# Patient Record
Sex: MALE | Race: BLACK OR AFRICAN AMERICAN | NOT HISPANIC OR LATINO | URBAN - METROPOLITAN AREA
[De-identification: names, ages, dates, MRNs, and addresses within clinical notes are randomized per-mention and may not be internally consistent; named-entity substitution may affect disease eponyms.]

---

## 2017-02-07 ENCOUNTER — OUTPATIENT (OUTPATIENT)
Dept: OUTPATIENT SERVICES | Facility: HOSPITAL | Age: 72
LOS: 1 days | Discharge: HOME | End: 2017-02-07

## 2017-02-07 DIAGNOSIS — D61.9 APLASTIC ANEMIA, UNSPECIFIED: ICD-10-CM

## 2017-02-09 ENCOUNTER — RESULT REVIEW (OUTPATIENT)
Age: 72
End: 2017-02-09

## 2017-02-10 ENCOUNTER — APPOINTMENT (OUTPATIENT)
Dept: HEMATOLOGY ONCOLOGY | Facility: CLINIC | Age: 72
End: 2017-02-10

## 2017-02-10 VITALS
DIASTOLIC BLOOD PRESSURE: 85 MMHG | WEIGHT: 213.85 LBS | TEMPERATURE: 97.9 F | SYSTOLIC BLOOD PRESSURE: 131 MMHG | RESPIRATION RATE: 16 BRPM | BODY MASS INDEX: 31.58 KG/M2 | HEART RATE: 79 BPM | OXYGEN SATURATION: 99 %

## 2017-02-10 DIAGNOSIS — Z86.2 PERSONAL HISTORY OF DISEASES OF THE BLOOD AND BLOOD-FORMING ORGANS AND CERTAIN DISORDERS INVOLVING THE IMMUNE MECHANISM: ICD-10-CM

## 2017-02-10 LAB
BASOPHILS # BLD AUTO: 0.1 K/UL — SIGNIFICANT CHANGE UP (ref 0–0.2)
BASOPHILS NFR BLD AUTO: 1.2 % — SIGNIFICANT CHANGE UP (ref 0–2)
EOSINOPHIL # BLD AUTO: 0.1 K/UL — SIGNIFICANT CHANGE UP (ref 0–0.5)
EOSINOPHIL NFR BLD AUTO: 1.1 % — SIGNIFICANT CHANGE UP (ref 0–6)
HCT VFR BLD CALC: 42.1 % — SIGNIFICANT CHANGE UP (ref 39–50)
HGB BLD-MCNC: 14 G/DL — SIGNIFICANT CHANGE UP (ref 13–17)
LYMPHOCYTES # BLD AUTO: 2.2 K/UL — SIGNIFICANT CHANGE UP (ref 1–3.3)
LYMPHOCYTES # BLD AUTO: 44 % — SIGNIFICANT CHANGE UP (ref 13–44)
MCHC RBC-ENTMCNC: 33.3 GM/DL — SIGNIFICANT CHANGE UP (ref 32–36)
MCHC RBC-ENTMCNC: 34.4 PG — HIGH (ref 27–34)
MCV RBC AUTO: 103 FL — HIGH (ref 80–100)
MONOCYTES # BLD AUTO: 0.5 K/UL — SIGNIFICANT CHANGE UP (ref 0–0.9)
MONOCYTES NFR BLD AUTO: 9.4 % — SIGNIFICANT CHANGE UP (ref 2–14)
NEUTROPHILS # BLD AUTO: 2.2 K/UL — SIGNIFICANT CHANGE UP (ref 1.8–7.4)
NEUTROPHILS NFR BLD AUTO: 44.3 % — SIGNIFICANT CHANGE UP (ref 43–77)
PLATELET # BLD AUTO: 106 K/UL — LOW (ref 150–400)
RBC # BLD: 4.07 M/UL — LOW (ref 4.2–5.8)
RBC # FLD: 11.7 % — SIGNIFICANT CHANGE UP (ref 10.3–14.5)
WBC # BLD: 5.1 K/UL — SIGNIFICANT CHANGE UP (ref 3.8–10.5)
WBC # FLD AUTO: 5.1 K/UL — SIGNIFICANT CHANGE UP (ref 3.8–10.5)

## 2017-02-11 LAB
ALBUMIN SERPL ELPH-MCNC: 4.3 G/DL
ALP BLD-CCNC: 103 U/L
ALT SERPL-CCNC: 12 U/L
ANION GAP SERPL CALC-SCNC: 13 MMOL/L
AST SERPL-CCNC: 19 U/L
BILIRUB SERPL-MCNC: 0.9 MG/DL
BUN SERPL-MCNC: 14 MG/DL
CALCIUM SERPL-MCNC: 9.4 MG/DL
CHLORIDE SERPL-SCNC: 105 MMOL/L
CO2 SERPL-SCNC: 23 MMOL/L
CREAT SERPL-MCNC: 1.3 MG/DL
FERRITIN SERPL-MCNC: 299.4 NG/ML
GLUCOSE SERPL-MCNC: 103 MG/DL
LDH SERPL-CCNC: 200 U/L
POTASSIUM SERPL-SCNC: 4.7 MMOL/L
PROT SERPL-MCNC: 7.2 G/DL
SODIUM SERPL-SCNC: 141 MMOL/L

## 2017-02-13 LAB — GASTRIN SERPL-MCNC: 55 PG/ML

## 2017-03-15 ENCOUNTER — APPOINTMENT (OUTPATIENT)
Age: 72
End: 2017-03-15

## 2017-03-15 VITALS
HEIGHT: 69 IN | HEART RATE: 76 BPM | DIASTOLIC BLOOD PRESSURE: 79 MMHG | RESPIRATION RATE: 17 BRPM | SYSTOLIC BLOOD PRESSURE: 163 MMHG | WEIGHT: 214 LBS | BODY MASS INDEX: 31.7 KG/M2 | TEMPERATURE: 97.4 F

## 2017-05-18 ENCOUNTER — APPOINTMENT (OUTPATIENT)
Dept: ORTHOPEDIC SURGERY | Facility: CLINIC | Age: 72
End: 2017-05-18

## 2017-05-18 VITALS
BODY MASS INDEX: 31.7 KG/M2 | WEIGHT: 214 LBS | SYSTOLIC BLOOD PRESSURE: 155 MMHG | HEIGHT: 69 IN | DIASTOLIC BLOOD PRESSURE: 80 MMHG

## 2017-10-24 ENCOUNTER — OUTPATIENT (OUTPATIENT)
Dept: OUTPATIENT SERVICES | Facility: HOSPITAL | Age: 72
LOS: 1 days | Discharge: HOME | End: 2017-10-24

## 2017-10-24 DIAGNOSIS — D61.9 APLASTIC ANEMIA, UNSPECIFIED: ICD-10-CM

## 2017-10-27 ENCOUNTER — RESULT REVIEW (OUTPATIENT)
Age: 72
End: 2017-10-27

## 2017-10-27 ENCOUNTER — APPOINTMENT (OUTPATIENT)
Dept: INFUSION THERAPY | Facility: HOSPITAL | Age: 72
End: 2017-10-27
Payer: MEDICARE

## 2017-10-27 ENCOUNTER — APPOINTMENT (OUTPATIENT)
Dept: HEMATOLOGY ONCOLOGY | Facility: CLINIC | Age: 72
End: 2017-10-27
Payer: MEDICARE

## 2017-10-27 VITALS
WEIGHT: 205.03 LBS | TEMPERATURE: 98.5 F | SYSTOLIC BLOOD PRESSURE: 127 MMHG | OXYGEN SATURATION: 100 % | BODY MASS INDEX: 30.28 KG/M2 | HEART RATE: 72 BPM | DIASTOLIC BLOOD PRESSURE: 82 MMHG | RESPIRATION RATE: 16 BRPM

## 2017-10-27 LAB
ALBUMIN SERPL ELPH-MCNC: 4 G/DL
ALP BLD-CCNC: 91 U/L
ALT SERPL-CCNC: 12 U/L
ANION GAP SERPL CALC-SCNC: 9 MMOL/L
AST SERPL-CCNC: 23 U/L
BASOPHILS # BLD AUTO: 0.1 K/UL — SIGNIFICANT CHANGE UP (ref 0–0.2)
BASOPHILS NFR BLD AUTO: 1.1 % — SIGNIFICANT CHANGE UP (ref 0–2)
BILIRUB SERPL-MCNC: 0.7 MG/DL
BUN SERPL-MCNC: 12 MG/DL
CALCIUM SERPL-MCNC: 9.7 MG/DL
CHLORIDE SERPL-SCNC: 108 MMOL/L
CO2 SERPL-SCNC: 27 MMOL/L
CREAT SERPL-MCNC: 1.38 MG/DL
EOSINOPHIL # BLD AUTO: 0.1 K/UL — SIGNIFICANT CHANGE UP (ref 0–0.5)
EOSINOPHIL NFR BLD AUTO: 1.7 % — SIGNIFICANT CHANGE UP (ref 0–6)
FERRITIN SERPL-MCNC: 375 NG/ML
GLUCOSE SERPL-MCNC: 95 MG/DL
HCT VFR BLD CALC: 40.1 % — SIGNIFICANT CHANGE UP (ref 39–50)
HGB BLD-MCNC: 13.7 G/DL — SIGNIFICANT CHANGE UP (ref 13–17)
LYMPHOCYTES # BLD AUTO: 2.3 K/UL — SIGNIFICANT CHANGE UP (ref 1–3.3)
LYMPHOCYTES # BLD AUTO: 43.3 % — SIGNIFICANT CHANGE UP (ref 13–44)
MCHC RBC-ENTMCNC: 34.2 G/DL — SIGNIFICANT CHANGE UP (ref 32–36)
MCHC RBC-ENTMCNC: 35 PG — HIGH (ref 27–34)
MCV RBC AUTO: 102 FL — HIGH (ref 80–100)
MONOCYTES # BLD AUTO: 0.4 K/UL — SIGNIFICANT CHANGE UP (ref 0–0.9)
MONOCYTES NFR BLD AUTO: 7.8 % — SIGNIFICANT CHANGE UP (ref 2–14)
NEUTROPHILS # BLD AUTO: 2.5 K/UL — SIGNIFICANT CHANGE UP (ref 1.8–7.4)
NEUTROPHILS NFR BLD AUTO: 46.1 % — SIGNIFICANT CHANGE UP (ref 43–77)
PLATELET # BLD AUTO: 108 K/UL — SIGNIFICANT CHANGE UP (ref 150–400)
POTASSIUM SERPL-SCNC: 4.5 MMOL/L
PROT SERPL-MCNC: 6.8 G/DL
RBC # BLD: 3.91 M/UL — LOW (ref 4.2–5.8)
RBC # FLD: 11.7 % — SIGNIFICANT CHANGE UP (ref 10.3–14.5)
SODIUM SERPL-SCNC: 144 MMOL/L
WBC # BLD: 5.4 K/UL — SIGNIFICANT CHANGE UP (ref 3.8–10.5)
WBC # FLD AUTO: 5.4 K/UL — SIGNIFICANT CHANGE UP (ref 3.8–10.5)

## 2017-10-27 PROCEDURE — 99215 OFFICE O/P EST HI 40 MIN: CPT

## 2017-10-27 PROCEDURE — G0008: CPT

## 2017-10-27 RX ORDER — MELOXICAM 15 MG/1
15 TABLET ORAL DAILY
Qty: 30 | Refills: 1 | Status: DISCONTINUED | OUTPATIENT
Start: 2017-05-18 | End: 2017-10-27

## 2017-11-16 ENCOUNTER — APPOINTMENT (OUTPATIENT)
Dept: ORTHOPEDIC SURGERY | Facility: CLINIC | Age: 72
End: 2017-11-16

## 2017-11-27 ENCOUNTER — APPOINTMENT (OUTPATIENT)
Dept: OPHTHALMOLOGY | Facility: CLINIC | Age: 72
End: 2017-11-27
Payer: MEDICARE

## 2017-11-27 PROCEDURE — 92004 COMPRE OPH EXAM NEW PT 1/>: CPT

## 2017-11-27 PROCEDURE — 92014 COMPRE OPH EXAM EST PT 1/>: CPT

## 2018-03-14 ENCOUNTER — APPOINTMENT (OUTPATIENT)
Age: 73
End: 2018-03-14

## 2018-06-20 ENCOUNTER — LABORATORY RESULT (OUTPATIENT)
Age: 73
End: 2018-06-20

## 2018-06-20 ENCOUNTER — APPOINTMENT (OUTPATIENT)
Dept: HEPATOLOGY | Facility: CLINIC | Age: 73
End: 2018-06-20
Payer: MEDICARE

## 2018-06-20 VITALS
BODY MASS INDEX: 29.18 KG/M2 | RESPIRATION RATE: 15 BRPM | HEART RATE: 84 BPM | HEIGHT: 69 IN | WEIGHT: 197 LBS | DIASTOLIC BLOOD PRESSURE: 80 MMHG | TEMPERATURE: 98.1 F | SYSTOLIC BLOOD PRESSURE: 142 MMHG

## 2018-06-20 PROCEDURE — 99214 OFFICE O/P EST MOD 30 MIN: CPT

## 2018-06-21 LAB
ALBUMIN SERPL ELPH-MCNC: 3.9 G/DL
ALP BLD-CCNC: 113 U/L
ALT SERPL-CCNC: 13 U/L
ANION GAP SERPL CALC-SCNC: 12 MMOL/L
AST SERPL-CCNC: 18 U/L
BASOPHILS # BLD AUTO: 0.01 K/UL
BASOPHILS NFR BLD AUTO: 0.2 %
BILIRUB SERPL-MCNC: 0.6 MG/DL
BUN SERPL-MCNC: 11 MG/DL
CALCIUM SERPL-MCNC: 9.9 MG/DL
CHLORIDE SERPL-SCNC: 108 MMOL/L
CO2 SERPL-SCNC: 24 MMOL/L
CREAT SERPL-MCNC: 1.39 MG/DL
EOSINOPHIL # BLD AUTO: 0.06 K/UL
EOSINOPHIL NFR BLD AUTO: 1.2 %
HCT VFR BLD CALC: 39.8 %
HCV RNA SERPL NAA DL=5-ACNC: NOT DETECTED
HCV RNA SERPL NAA+PROBE-LOG IU: NOT DETECTED LOGIU/ML
HGB BLD-MCNC: 13 G/DL
IMM GRANULOCYTES NFR BLD AUTO: 0.2 %
LYMPHOCYTES # BLD AUTO: 2.31 K/UL
LYMPHOCYTES NFR BLD AUTO: 47.2 %
MAN DIFF?: NORMAL
MCHC RBC-ENTMCNC: 32.7 GM/DL
MCHC RBC-ENTMCNC: 33 PG
MCV RBC AUTO: 101 FL
MONOCYTES # BLD AUTO: 0.41 K/UL
MONOCYTES NFR BLD AUTO: 8.4 %
NEUTROPHILS # BLD AUTO: 2.09 K/UL
NEUTROPHILS NFR BLD AUTO: 42.8 %
PLATELET # BLD AUTO: 133 K/UL
POTASSIUM SERPL-SCNC: 4.8 MMOL/L
PROT SERPL-MCNC: 7 G/DL
RBC # BLD: 3.94 M/UL
RBC # FLD: 13.2 %
SODIUM SERPL-SCNC: 144 MMOL/L
WBC # FLD AUTO: 4.89 K/UL

## 2018-06-28 ENCOUNTER — OUTPATIENT (OUTPATIENT)
Dept: OUTPATIENT SERVICES | Facility: HOSPITAL | Age: 73
LOS: 1 days | Discharge: HOME | End: 2018-06-28

## 2018-06-28 DIAGNOSIS — D61.9 APLASTIC ANEMIA, UNSPECIFIED: ICD-10-CM

## 2018-07-03 ENCOUNTER — RESULT REVIEW (OUTPATIENT)
Age: 73
End: 2018-07-03

## 2018-07-03 ENCOUNTER — APPOINTMENT (OUTPATIENT)
Dept: HEMATOLOGY ONCOLOGY | Facility: CLINIC | Age: 73
End: 2018-07-03
Payer: MEDICARE

## 2018-07-03 VITALS
BODY MASS INDEX: 30.34 KG/M2 | SYSTOLIC BLOOD PRESSURE: 135 MMHG | RESPIRATION RATE: 16 BRPM | WEIGHT: 205.47 LBS | HEART RATE: 74 BPM | TEMPERATURE: 97.9 F | DIASTOLIC BLOOD PRESSURE: 75 MMHG | OXYGEN SATURATION: 100 %

## 2018-07-03 LAB
ALBUMIN SERPL ELPH-MCNC: 3.9 G/DL
ALP BLD-CCNC: 141 U/L
ALT SERPL-CCNC: 13 U/L
ANION GAP SERPL CALC-SCNC: 11 MMOL/L
AST SERPL-CCNC: 17 U/L
BASOPHILS # BLD AUTO: 0 K/UL — SIGNIFICANT CHANGE UP (ref 0–0.2)
BASOPHILS NFR BLD AUTO: 0.6 % — SIGNIFICANT CHANGE UP (ref 0–2)
BILIRUB SERPL-MCNC: 0.3 MG/DL
BUN SERPL-MCNC: 12 MG/DL
CALCIUM SERPL-MCNC: 9.2 MG/DL
CHLORIDE SERPL-SCNC: 110 MMOL/L
CO2 SERPL-SCNC: 22 MMOL/L
CREAT SERPL-MCNC: 1.39 MG/DL
EOSINOPHIL # BLD AUTO: 0.1 K/UL — SIGNIFICANT CHANGE UP (ref 0–0.5)
EOSINOPHIL NFR BLD AUTO: 1.7 % — SIGNIFICANT CHANGE UP (ref 0–6)
FERRITIN SERPL-MCNC: 358 NG/ML
GLUCOSE SERPL-MCNC: 100 MG/DL
HCT VFR BLD CALC: 39.7 % — SIGNIFICANT CHANGE UP (ref 39–50)
HGB BLD-MCNC: 13.4 G/DL — SIGNIFICANT CHANGE UP (ref 13–17)
LYMPHOCYTES # BLD AUTO: 1.9 K/UL — SIGNIFICANT CHANGE UP (ref 1–3.3)
LYMPHOCYTES # BLD AUTO: 44 % — SIGNIFICANT CHANGE UP (ref 13–44)
MCHC RBC-ENTMCNC: 33.7 G/DL — SIGNIFICANT CHANGE UP (ref 32–36)
MCHC RBC-ENTMCNC: 34.4 PG — HIGH (ref 27–34)
MCV RBC AUTO: 102 FL — HIGH (ref 80–100)
MONOCYTES # BLD AUTO: 0.4 K/UL — SIGNIFICANT CHANGE UP (ref 0–0.9)
MONOCYTES NFR BLD AUTO: 9.4 % — SIGNIFICANT CHANGE UP (ref 2–14)
NEUTROPHILS # BLD AUTO: 1.9 K/UL — SIGNIFICANT CHANGE UP (ref 1.8–7.4)
NEUTROPHILS NFR BLD AUTO: 44.3 % — SIGNIFICANT CHANGE UP (ref 43–77)
PLATELET # BLD AUTO: 102 K/UL — LOW (ref 150–400)
POTASSIUM SERPL-SCNC: 4.3 MMOL/L
PROT SERPL-MCNC: 6.5 G/DL
RBC # BLD: 3.89 M/UL — LOW (ref 4.2–5.8)
RBC # FLD: 11.9 % — SIGNIFICANT CHANGE UP (ref 10.3–14.5)
RETICS #: 62.9 K/UL — SIGNIFICANT CHANGE UP (ref 25–125)
RETICS/RBC NFR: 1.6 % — SIGNIFICANT CHANGE UP (ref 0.5–2.5)
SODIUM SERPL-SCNC: 143 MMOL/L
WBC # BLD: 4.4 K/UL — SIGNIFICANT CHANGE UP (ref 3.8–10.5)
WBC # FLD AUTO: 4.4 K/UL — SIGNIFICANT CHANGE UP (ref 3.8–10.5)

## 2018-07-03 PROCEDURE — 99214 OFFICE O/P EST MOD 30 MIN: CPT

## 2018-07-05 LAB — GASTRIN SERPL-MCNC: 51 PG/ML

## 2018-07-10 ENCOUNTER — APPOINTMENT (OUTPATIENT)
Dept: HEPATOLOGY | Facility: CLINIC | Age: 73
End: 2018-07-10
Payer: MEDICARE

## 2018-07-10 PROCEDURE — 91200 LIVER ELASTOGRAPHY: CPT

## 2018-07-16 ENCOUNTER — FORM ENCOUNTER (OUTPATIENT)
Age: 73
End: 2018-07-16

## 2018-07-17 ENCOUNTER — OUTPATIENT (OUTPATIENT)
Dept: OUTPATIENT SERVICES | Facility: HOSPITAL | Age: 73
LOS: 1 days | End: 2018-07-17
Payer: MEDICARE

## 2018-07-17 ENCOUNTER — APPOINTMENT (OUTPATIENT)
Dept: ULTRASOUND IMAGING | Facility: IMAGING CENTER | Age: 73
End: 2018-07-17
Payer: MEDICARE

## 2018-07-17 DIAGNOSIS — D61.9 APLASTIC ANEMIA, UNSPECIFIED: ICD-10-CM

## 2018-07-17 PROCEDURE — 76700 US EXAM ABDOM COMPLETE: CPT

## 2018-07-17 PROCEDURE — 76700 US EXAM ABDOM COMPLETE: CPT | Mod: 26

## 2018-10-10 ENCOUNTER — APPOINTMENT (OUTPATIENT)
Dept: ORTHOPEDIC SURGERY | Facility: CLINIC | Age: 73
End: 2018-10-10
Payer: MEDICARE

## 2018-10-10 VITALS
DIASTOLIC BLOOD PRESSURE: 78 MMHG | WEIGHT: 200 LBS | BODY MASS INDEX: 29.62 KG/M2 | HEIGHT: 69 IN | SYSTOLIC BLOOD PRESSURE: 124 MMHG | HEART RATE: 71 BPM

## 2018-10-10 PROCEDURE — 72170 X-RAY EXAM OF PELVIS: CPT

## 2018-10-10 PROCEDURE — 99214 OFFICE O/P EST MOD 30 MIN: CPT

## 2018-12-14 ENCOUNTER — APPOINTMENT (OUTPATIENT)
Dept: UROLOGY | Facility: CLINIC | Age: 73
End: 2018-12-14
Payer: MEDICARE

## 2018-12-14 PROCEDURE — 51798 US URINE CAPACITY MEASURE: CPT

## 2018-12-14 PROCEDURE — 99204 OFFICE O/P NEW MOD 45 MIN: CPT | Mod: 25

## 2018-12-17 LAB
APPEARANCE: CLEAR
BACTERIA: NEGATIVE
BILIRUBIN URINE: NEGATIVE
BLOOD URINE: NEGATIVE
COLOR: YELLOW
GLUCOSE QUALITATIVE U: NEGATIVE MG/DL
HYALINE CASTS: 0 /LPF
KETONES URINE: ABNORMAL
LEUKOCYTE ESTERASE URINE: NEGATIVE
MICROSCOPIC-UA: NORMAL
NITRITE URINE: NEGATIVE
PH URINE: 6
PROTEIN URINE: NEGATIVE MG/DL
RED BLOOD CELLS URINE: 3 /HPF
SPECIFIC GRAVITY URINE: 1.02
SQUAMOUS EPITHELIAL CELLS: 0 /HPF
UROBILINOGEN URINE: 1 MG/DL
WHITE BLOOD CELLS URINE: 1 /HPF

## 2019-04-12 ENCOUNTER — EMERGENCY (EMERGENCY)
Facility: HOSPITAL | Age: 74
LOS: 0 days | Discharge: HOME | End: 2019-04-12
Payer: COMMERCIAL

## 2019-04-12 VITALS
SYSTOLIC BLOOD PRESSURE: 131 MMHG | RESPIRATION RATE: 17 BRPM | OXYGEN SATURATION: 100 % | DIASTOLIC BLOOD PRESSURE: 89 MMHG | HEIGHT: 69 IN | TEMPERATURE: 98 F | WEIGHT: 199.96 LBS | HEART RATE: 74 BPM
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
## 2019-04-12 DIAGNOSIS — Z04.1 ENCOUNTER FOR EXAMINATION AND OBSERVATION FOLLOWING TRANSPORT ACCIDENT: ICD-10-CM
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
## 2019-04-12 PROCEDURE — 99283 EMERGENCY DEPT VISIT LOW MDM: CPT

## 2019-04-12 NOTE — ED PROVIDER NOTE - CHPI ED SYMPTOMS NEG
no dizziness/no laceration/no bruising/no back pain/no difficulty bearing weight/no headache/no loss of consciousness/no pain/no disorientation/no neck tenderness
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
## 2019-05-24 PROBLEM — Z78.9 OTHER SPECIFIED HEALTH STATUS: Chronic | Status: ACTIVE | Noted: 2019-04-12

## 2019-06-14 ENCOUNTER — OUTPATIENT (OUTPATIENT)
Dept: OUTPATIENT SERVICES | Facility: HOSPITAL | Age: 74
LOS: 1 days | Discharge: HOME | End: 2019-06-14

## 2019-06-14 DIAGNOSIS — D61.9 APLASTIC ANEMIA, UNSPECIFIED: ICD-10-CM

## 2019-06-19 ENCOUNTER — APPOINTMENT (OUTPATIENT)
Dept: HEPATOLOGY | Facility: CLINIC | Age: 74
End: 2019-06-19
Payer: MEDICARE

## 2019-06-19 VITALS
HEART RATE: 73 BPM | BODY MASS INDEX: 29.92 KG/M2 | HEIGHT: 69 IN | SYSTOLIC BLOOD PRESSURE: 108 MMHG | DIASTOLIC BLOOD PRESSURE: 75 MMHG | WEIGHT: 202 LBS | RESPIRATION RATE: 16 BRPM | TEMPERATURE: 98.4 F

## 2019-06-19 PROCEDURE — 99214 OFFICE O/P EST MOD 30 MIN: CPT

## 2019-06-19 NOTE — CONSULT LETTER
[Dear  ___] : Dear  [unfilled], [Courtesy Letter:] : I had the pleasure of seeing your patient, [unfilled], in my office today. [Please see my note below.] : Please see my note below. [Sincerely,] : Sincerely, [DrSurya  ___] : Dr. PECK [DrSurya ___] : Dr. PECK

## 2019-06-20 ENCOUNTER — APPOINTMENT (OUTPATIENT)
Dept: HEMATOLOGY ONCOLOGY | Facility: CLINIC | Age: 74
End: 2019-06-20
Payer: MEDICARE

## 2019-06-20 ENCOUNTER — RESULT REVIEW (OUTPATIENT)
Age: 74
End: 2019-06-20

## 2019-06-20 VITALS
DIASTOLIC BLOOD PRESSURE: 69 MMHG | SYSTOLIC BLOOD PRESSURE: 129 MMHG | TEMPERATURE: 97.8 F | BODY MASS INDEX: 30.18 KG/M2 | RESPIRATION RATE: 18 BRPM | WEIGHT: 204.37 LBS | HEART RATE: 66 BPM | OXYGEN SATURATION: 99 %

## 2019-06-20 LAB
ALBUMIN SERPL ELPH-MCNC: 4.2 G/DL
ALP BLD-CCNC: 147 U/L
ALT SERPL-CCNC: 11 U/L
ANION GAP SERPL CALC-SCNC: 11 MMOL/L
AST SERPL-CCNC: 13 U/L
BASOPHILS # BLD AUTO: 0 K/UL — SIGNIFICANT CHANGE UP (ref 0–0.2)
BASOPHILS NFR BLD AUTO: 0.7 % — SIGNIFICANT CHANGE UP (ref 0–2)
BILIRUB SERPL-MCNC: 0.3 MG/DL
BUN SERPL-MCNC: 14 MG/DL
CALCIUM SERPL-MCNC: 9.7 MG/DL
CHLORIDE SERPL-SCNC: 105 MMOL/L
CO2 SERPL-SCNC: 24 MMOL/L
CREAT SERPL-MCNC: 1.33 MG/DL
EOSINOPHIL # BLD AUTO: 0.1 K/UL — SIGNIFICANT CHANGE UP (ref 0–0.5)
EOSINOPHIL NFR BLD AUTO: 1 % — SIGNIFICANT CHANGE UP (ref 0–6)
FERRITIN SERPL-MCNC: 498 NG/ML
GLUCOSE SERPL-MCNC: 102 MG/DL
HCT VFR BLD CALC: 40.3 % — SIGNIFICANT CHANGE UP (ref 39–50)
HGB BLD-MCNC: 13.7 G/DL — SIGNIFICANT CHANGE UP (ref 13–17)
LDH SERPL-CCNC: 160 U/L
LYMPHOCYTES # BLD AUTO: 2.3 K/UL — SIGNIFICANT CHANGE UP (ref 1–3.3)
LYMPHOCYTES # BLD AUTO: 40.2 % — SIGNIFICANT CHANGE UP (ref 13–44)
MCHC RBC-ENTMCNC: 33.8 G/DL — SIGNIFICANT CHANGE UP (ref 32–36)
MCHC RBC-ENTMCNC: 34.1 PG — HIGH (ref 27–34)
MCV RBC AUTO: 101 FL — HIGH (ref 80–100)
MONOCYTES # BLD AUTO: 0.5 K/UL — SIGNIFICANT CHANGE UP (ref 0–0.9)
MONOCYTES NFR BLD AUTO: 9.6 % — SIGNIFICANT CHANGE UP (ref 2–14)
NEUTROPHILS # BLD AUTO: 2.8 K/UL — SIGNIFICANT CHANGE UP (ref 1.8–7.4)
NEUTROPHILS NFR BLD AUTO: 48.5 % — SIGNIFICANT CHANGE UP (ref 43–77)
PLATELET # BLD AUTO: 139 K/UL — LOW (ref 150–400)
POTASSIUM SERPL-SCNC: 5.3 MMOL/L
PROT SERPL-MCNC: 6.8 G/DL
RBC # BLD: 4 M/UL — LOW (ref 4.2–5.8)
RBC # FLD: 11.9 % — SIGNIFICANT CHANGE UP (ref 10.3–14.5)
SODIUM SERPL-SCNC: 140 MMOL/L
WBC # BLD: 5.7 K/UL — SIGNIFICANT CHANGE UP (ref 3.8–10.5)
WBC # FLD AUTO: 5.7 K/UL — SIGNIFICANT CHANGE UP (ref 3.8–10.5)

## 2019-06-20 PROCEDURE — 99214 OFFICE O/P EST MOD 30 MIN: CPT

## 2019-06-20 RX ORDER — ESCITALOPRAM OXALATE 10 MG/1
10 TABLET ORAL
Qty: 30 | Refills: 0 | Status: DISCONTINUED | COMMUNITY
Start: 2018-01-17 | End: 2019-06-20
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
## 2019-06-20 NOTE — REVIEW OF SYSTEMS
[Negative] : Allergic/Immunologic [FreeTextEntry4] : tinnitus - chronic without change. MRIs negative 2015. [de-identified] : tinnitus left ear. [FreeTextEntry9] : decreased RT hip pain.

## 2019-06-23 LAB — GASTRIN SERPL-MCNC: 42 PG/ML

## 2019-06-28 ENCOUNTER — OUTPATIENT (OUTPATIENT)
Dept: OUTPATIENT SERVICES | Facility: HOSPITAL | Age: 74
LOS: 1 days | End: 2019-06-28
Payer: MEDICARE

## 2019-06-28 ENCOUNTER — APPOINTMENT (OUTPATIENT)
Dept: UROLOGY | Facility: CLINIC | Age: 74
End: 2019-06-28
Payer: MEDICARE

## 2019-06-28 PROCEDURE — 99213 OFFICE O/P EST LOW 20 MIN: CPT | Mod: 25

## 2019-06-28 PROCEDURE — 76872 US TRANSRECTAL: CPT

## 2019-06-28 PROCEDURE — 76872 US TRANSRECTAL: CPT | Mod: 26

## 2019-06-28 NOTE — HISTORY OF PRESENT ILLNESS
[FreeTextEntry1] : Patient seen for evaluation of urinary issues, specifically frequency. he notes progressively over past 2 years increased daytime frequency with urgency though leakage associated with nocturia 2-3 times. The FOS is decent without hesitancy, intermittency or straining. He has no h/o UTIs or retention.\par he has prior h/o microscopic hematuria with negative W/U including cystoscopy. \par No prior issues with elevated PSA.\par \par Trial Tamsulosin - didn’t see a benefit after a few months so stopped. symptoms about the same\par by TRUS prostate 41cc

## 2019-06-28 NOTE — ASSESSMENT
[FreeTextEntry1] : prostate note large enough for finasteride \par trial of Rapaflo - needs a Uroflow if no effect

## 2019-07-03 DIAGNOSIS — N40.1 BENIGN PROSTATIC HYPERPLASIA WITH LOWER URINARY TRACT SYMPTOMS: ICD-10-CM

## 2019-07-16 ENCOUNTER — APPOINTMENT (OUTPATIENT)
Dept: PULMONOLOGY | Facility: CLINIC | Age: 74
End: 2019-07-16
Payer: MEDICARE

## 2019-07-16 VITALS
SYSTOLIC BLOOD PRESSURE: 131 MMHG | RESPIRATION RATE: 16 BRPM | DIASTOLIC BLOOD PRESSURE: 86 MMHG | HEIGHT: 69 IN | WEIGHT: 205 LBS | HEART RATE: 61 BPM | BODY MASS INDEX: 30.36 KG/M2 | TEMPERATURE: 97.6 F | OXYGEN SATURATION: 99 %

## 2019-07-16 PROCEDURE — 94726 PLETHYSMOGRAPHY LUNG VOLUMES: CPT

## 2019-07-16 PROCEDURE — 94729 DIFFUSING CAPACITY: CPT

## 2019-07-16 PROCEDURE — ZZZZZ: CPT

## 2019-07-16 PROCEDURE — 99204 OFFICE O/P NEW MOD 45 MIN: CPT | Mod: 25

## 2019-07-16 PROCEDURE — 94010 BREATHING CAPACITY TEST: CPT

## 2019-07-16 NOTE — HISTORY OF PRESENT ILLNESS
[Stable] : are stable [None] : ~He/She~ has no significant interval events [Difficulty Breathing During Exertion] : denies dyspnea on exertion [Feelings Of Weakness On Exertion] : denies exercise intolerance [Cough] : denies coughing [Wheezing] : denies wheezing [Regional Soft Tissue Swelling Both Lower Extremities] : denies lower extremity edema [Chest Pain Or Discomfort] : denies chest pain [Fever] : denies fever [Snoring] : snoring [Witnessed Apneas] : witnessed sleep apnea [ESS: ___] : ESS score [unfilled] [To Bed: ___] : ~he/she~ goes to bed at [unfilled] [Arises: ___] : arises at [unfilled] [Sleep Onset Latency: ___ minutes] : sleep onset latency of [unfilled] minutes reported [Nocturnal Awakenings: ___] : ~he/she~ typically has [unfilled] nocturnal awakenings [TST: ___] : Total sleep time is [unfilled] [Frequent Nocturnal Awakening] : no nocturnal awakening [Daytime Somnolence] : no daytime somnolence [Unintentional Sleep while Active] : no unintentional sleep while active [Unintentional Sleep While Inactive] : no unintentional sleep while inactive [Awakes Unrefreshed] : does not awake unrefreshed [Awakes with Headache] : no headache upon awakening [Awakening With Dry Mouth] : no dry mouth upon awakening [Recent  Weight Gain] : no recent weight gain [DIS] : no DIS [DMS] : no DMS [Unusual Sleep Behavior] : no unusual sleep behavior [Hypersomnolence] : no hypersomnolence [Cataplexy] : no cataplexy [Sleep Paralysis] : no sleep paralysis [Hypnagogic Hallucinations] : no hallucinations when falling asleep [Hypnopompic Hallucinations] : no hallucinations when awakening [Lower Extremity Discomfort] : no lower extremity discomfort in evening or at bedtime [Nocturnal Oxygen] : The patient does not use nocturnal oxygen [FreeTextEntry1] : 74M  with HTN, BPH, hep C s/p treatent with Harvoni and undetectable viral levels since 2015, removed Gastrinoma in 2000, Aplastic anemia, and former smoker, quit in 1974 and smoked 0.5 ppd x 25 years presents for evaluation for some breathing difficulty.\par \par He was told a long time ago that he has Emphysema. He was given an Albuterol INH at the time but he doesn't use it. He denies wheezing, dyspnea at rest or exertion. He denies frequent pneumonias or URI.\par He had a fungal PNA many years ago when he was receiving Interferon and Ribavarin for treatment of Hep C.\par He has had undetectable levels since 2015 after receiving Harvoni.\par Patient has bruxism and snores but denies day time sleepiness, insomnia, narcolepsy.\par He saw his hematologist who thought he was breathing heavily and told referred him to us. \par He is without complaints today. \par Patient said he has some difficulty with breathing after his Laparotomy surgery in 2000 for gastrinoma but his breathing has been improved since then.

## 2019-07-16 NOTE — PHYSICAL EXAM
[General Appearance - Well Developed] : well developed [General Appearance - Well Nourished] : well nourished [III] : III [Jugular Venous Distention Increased] : there was no jugular-venous distention [Murmurs] : no murmurs present [Arterial Pulses Normal] : the arterial pulses were normal [Edema] : no peripheral edema present [Veins - Varicosity Changes] : no varicosital changes were noted in the lower extremities [Respiration, Rhythm And Depth] : normal respiratory rhythm and effort [Exaggerated Use Of Accessory Muscles For Inspiration] : no accessory muscle use [Auscultation Breath Sounds / Voice Sounds] : lungs were clear to auscultation bilaterally [Bowel Sounds] : normal bowel sounds [Abdomen Soft] : soft [Abdomen Tenderness] : non-tender [Abnormal Walk] : normal gait [Nail Clubbing] : no clubbing of the fingernails [Cyanosis, Localized] : no localized cyanosis [Skin Turgor] : normal skin turgor [] : no rash [No Focal Deficits] : no focal deficits [Normal Oropharynx] : abnormal oropharynx [FreeTextEntry1] : Healed midline abdominal laparotomy scar. No hernias.

## 2019-07-16 NOTE — ASSESSMENT
[FreeTextEntry1] : 74M former smoker quit 1974 and smoked 0.5 ppd x 24 years presents for pulmonary evaluation.\par \par - No active pulmonary issues at this time. No hypoxia or dyspnea noted today.\par - He has not had pulmonary imaging since 2005 he quit > 15 yrs ago so will defer on screening lung imaging at this time.\par - PFTs show only a reduction in diffusing capacity, which may have been technically suboptimal\par - Although he snores and has HTN, no h/o CVA, chronic use of Opiods or Heart failure, excessive daytime somnolence\par - Will have patient continue to use his mouth guard for bruxism and snoring since he doesn't have insomnia or daytime sleepiness.\par - If patient interested or if symptoms worsen, then would send patient for in lab PSG.

## 2019-07-16 NOTE — REVIEW OF SYSTEMS
[Negative] : Pulmonary Hypertension [Cough] : no cough [Sputum] : not coughing up ~M sputum [Dyspnea] : no dyspnea [Chest Tightness] : no chest tightness [Pleuritic Pain] : no pleuritic pain [Wheezing] : no wheezing [Edema] : ~T edema was not present

## 2019-10-16 ENCOUNTER — APPOINTMENT (OUTPATIENT)
Dept: ORTHOPEDIC SURGERY | Facility: CLINIC | Age: 74
End: 2019-10-16
Payer: MEDICARE

## 2019-10-16 PROCEDURE — 99213 OFFICE O/P EST LOW 20 MIN: CPT

## 2019-10-16 PROCEDURE — 73521 X-RAY EXAM HIPS BI 2 VIEWS: CPT

## 2019-10-16 NOTE — HISTORY OF PRESENT ILLNESS
[de-identified] : 74 year old male presents with chronic episodic right hip pain. Known history of Avascular necrosis. s/p left thr 2010.pain and symptom free.psot right and left hip core decompression  History of hepatitis treatment.medically treated. developed secondary aplastic anemia  secondary to allergy to interferon Has undergone chiropractic treatment in the past.

## 2019-10-16 NOTE — CONSULT LETTER
[Dear  ___] : Dear  [unfilled], [Please see my note below.] : Please see my note below. [Sincerely,] : Sincerely, [FreeTextEntry3] : Dr. Gamble

## 2019-10-16 NOTE — ADDENDUM
[FreeTextEntry1] : I, Prabhu León, acted solely as a scribe for Dr. Caio Gamble on 10/16/2019  .\par  \par All medical record entries made by the scribe were at my, Dr. Caio Gamble, direction and personally dictated by me on 10/16/2019. I have reviewed the chart and agree that the record accurately reflects my personal performance of the history, physical exam, assessment and plan. I have also personally directed, reviewed, and agreed with the chart.\par

## 2019-10-16 NOTE — DISCUSSION/SUMMARY
[de-identified] : The patient presents s/p left thr and intermittent right hip pain. secondary to AVN. quality of life still acceptable and therefore not a candidate for right total hip replacement\par \par Plan:\par FU 1 year. Continue chiropractor

## 2019-10-16 NOTE — PHYSICAL EXAM
[de-identified] : Well-nourished, in no acute distress\par Alert and oriented to time, place and person\par Skin: no lesions discoloration\par Respirations: unlabored\par Cardiac: no leg swelling\par Lymphatic: no groin adenopathy\par right hip: flexion 105, internal 5, external 5, abduction 40 degrees, abduction 30 degrees pain free\par   [de-identified] : AP Pelvis and AP/Lateral Xrays of the  hip taken in the office today demonstrates left hip satisfactory appearance right thr components and alignment. Right hip AVN right femoral head with subchondral collapse. and cysts

## 2019-12-06 ENCOUNTER — OUTPATIENT (OUTPATIENT)
Dept: OUTPATIENT SERVICES | Facility: HOSPITAL | Age: 74
LOS: 1 days | Discharge: HOME | End: 2019-12-06

## 2019-12-06 DIAGNOSIS — D61.9 APLASTIC ANEMIA, UNSPECIFIED: ICD-10-CM

## 2019-12-10 NOTE — CONSULT LETTER
[Dear  ___] : Dear  [unfilled], [Courtesy Letter:] : I had the pleasure of seeing your patient, [unfilled], in my office today. [Please see my note below.] : Please see my note below. [DrSurya  ___] : Dr. PECK [Sincerely,] : Sincerely, [DrSurya ___] : Dr. PECK

## 2019-12-12 ENCOUNTER — RESULT REVIEW (OUTPATIENT)
Age: 74
End: 2019-12-12

## 2019-12-12 ENCOUNTER — APPOINTMENT (OUTPATIENT)
Dept: HEMATOLOGY ONCOLOGY | Facility: CLINIC | Age: 74
End: 2019-12-12
Payer: MEDICARE

## 2019-12-12 VITALS
DIASTOLIC BLOOD PRESSURE: 81 MMHG | WEIGHT: 202.38 LBS | OXYGEN SATURATION: 100 % | SYSTOLIC BLOOD PRESSURE: 135 MMHG | RESPIRATION RATE: 18 BRPM | TEMPERATURE: 97.5 F | BODY MASS INDEX: 29.89 KG/M2 | HEART RATE: 60 BPM

## 2019-12-12 DIAGNOSIS — D73.9 DISEASE OF SPLEEN, UNSPECIFIED: ICD-10-CM

## 2019-12-12 DIAGNOSIS — E83.19 OTHER DISORDERS OF IRON METABOLISM: ICD-10-CM

## 2019-12-12 DIAGNOSIS — M87.00 IDIOPATHIC ASEPTIC NECROSIS OF UNSPECIFIED BONE: ICD-10-CM

## 2019-12-12 LAB
BASOPHILS # BLD AUTO: 0 K/UL — SIGNIFICANT CHANGE UP (ref 0–0.2)
BASOPHILS NFR BLD AUTO: 0.5 % — SIGNIFICANT CHANGE UP (ref 0–2)
EOSINOPHIL # BLD AUTO: 0 K/UL — SIGNIFICANT CHANGE UP (ref 0–0.5)
EOSINOPHIL NFR BLD AUTO: 0.8 % — SIGNIFICANT CHANGE UP (ref 0–6)
HCT VFR BLD CALC: 41.3 % — SIGNIFICANT CHANGE UP (ref 39–50)
HGB BLD-MCNC: 14.2 G/DL — SIGNIFICANT CHANGE UP (ref 13–17)
LYMPHOCYTES # BLD AUTO: 2 K/UL — SIGNIFICANT CHANGE UP (ref 1–3.3)
LYMPHOCYTES # BLD AUTO: 41.3 % — SIGNIFICANT CHANGE UP (ref 13–44)
MCHC RBC-ENTMCNC: 34.3 G/DL — SIGNIFICANT CHANGE UP (ref 32–36)
MCHC RBC-ENTMCNC: 34.8 PG — HIGH (ref 27–34)
MCV RBC AUTO: 101 FL — HIGH (ref 80–100)
MONOCYTES # BLD AUTO: 0.4 K/UL — SIGNIFICANT CHANGE UP (ref 0–0.9)
MONOCYTES NFR BLD AUTO: 8.2 % — SIGNIFICANT CHANGE UP (ref 2–14)
NEUTROPHILS # BLD AUTO: 2.3 K/UL — SIGNIFICANT CHANGE UP (ref 1.8–7.4)
NEUTROPHILS NFR BLD AUTO: 49.1 % — SIGNIFICANT CHANGE UP (ref 43–77)
PLATELET # BLD AUTO: 90 K/UL — LOW (ref 150–400)
RBC # BLD: 4.08 M/UL — LOW (ref 4.2–5.8)
RBC # FLD: 11.8 % — SIGNIFICANT CHANGE UP (ref 10.3–14.5)
WBC # BLD: 4.8 K/UL — SIGNIFICANT CHANGE UP (ref 3.8–10.5)
WBC # FLD AUTO: 4.8 K/UL — SIGNIFICANT CHANGE UP (ref 3.8–10.5)

## 2019-12-12 PROCEDURE — 99214 OFFICE O/P EST MOD 30 MIN: CPT

## 2019-12-12 RX ORDER — SILODOSIN 8 MG/1
8 CAPSULE ORAL
Qty: 30 | Refills: 5 | Status: DISCONTINUED | COMMUNITY
Start: 2019-06-28 | End: 2019-12-12

## 2019-12-12 RX ORDER — DEXTRAN 70/HYPROMELLOSE 0.1%-0.3%
0.1-0.3 DROPS OPHTHALMIC (EYE)
Refills: 0 | Status: DISCONTINUED | COMMUNITY
End: 2019-12-12

## 2019-12-12 RX ORDER — CICLOPIROX OLAMINE 7.7 MG/G
0.77 CREAM TOPICAL
Qty: 90 | Refills: 0 | Status: DISCONTINUED | COMMUNITY
Start: 2017-08-08 | End: 2019-12-12

## 2019-12-12 RX ORDER — ALBUTEROL SULFATE 90 UG/1
108 (90 BASE) AEROSOL, METERED RESPIRATORY (INHALATION)
Qty: 8 | Refills: 0 | Status: DISCONTINUED | COMMUNITY
Start: 2018-04-13 | End: 2019-12-12
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
## 2019-12-12 NOTE — REVIEW OF SYSTEMS
[Negative] : Allergic/Immunologic [FreeTextEntry9] : mild RT hip pain.  Patient continues to see chiropractor. Exercises on his own. [FreeTextEntry4] : L ear tinnitus - chronic without change. MRIs negative 2015. [de-identified] : tinnitus left ear.

## 2019-12-13 LAB
ALBUMIN SERPL ELPH-MCNC: 4.1 G/DL
ALP BLD-CCNC: 115 U/L
ALT SERPL-CCNC: 10 U/L
ANION GAP SERPL CALC-SCNC: 9 MMOL/L
AST SERPL-CCNC: 14 U/L
BILIRUB SERPL-MCNC: 0.8 MG/DL
BUN SERPL-MCNC: 12 MG/DL
CALCIUM SERPL-MCNC: 9.6 MG/DL
CHLORIDE SERPL-SCNC: 107 MMOL/L
CO2 SERPL-SCNC: 25 MMOL/L
CREAT SERPL-MCNC: 1.52 MG/DL
GLUCOSE SERPL-MCNC: 98 MG/DL
LDH SERPL-CCNC: 169 U/L
POTASSIUM SERPL-SCNC: 5.1 MMOL/L
PROT SERPL-MCNC: 6.8 G/DL
SODIUM SERPL-SCNC: 141 MMOL/L

## 2019-12-16 LAB — GASTRIN SERPL-MCNC: 50 PG/ML

## 2019-12-20 ENCOUNTER — FORM ENCOUNTER (OUTPATIENT)
Age: 74
End: 2019-12-20

## 2019-12-21 ENCOUNTER — APPOINTMENT (OUTPATIENT)
Dept: ULTRASOUND IMAGING | Facility: IMAGING CENTER | Age: 74
End: 2019-12-21
Payer: MEDICARE

## 2019-12-21 ENCOUNTER — OUTPATIENT (OUTPATIENT)
Dept: OUTPATIENT SERVICES | Facility: HOSPITAL | Age: 74
LOS: 1 days | End: 2019-12-21
Payer: MEDICARE

## 2019-12-21 DIAGNOSIS — D21.9 BENIGN NEOPLASM OF CONNECTIVE AND OTHER SOFT TISSUE, UNSPECIFIED: ICD-10-CM

## 2019-12-21 PROCEDURE — 76705 ECHO EXAM OF ABDOMEN: CPT | Mod: 26

## 2019-12-21 PROCEDURE — 76705 ECHO EXAM OF ABDOMEN: CPT

## 2020-01-17 ENCOUNTER — APPOINTMENT (OUTPATIENT)
Dept: UROLOGY | Facility: CLINIC | Age: 75
End: 2020-01-17
Payer: MEDICARE

## 2020-01-17 PROCEDURE — 99213 OFFICE O/P EST LOW 20 MIN: CPT | Mod: 25

## 2020-01-17 PROCEDURE — 51798 US URINE CAPACITY MEASURE: CPT

## 2020-01-17 NOTE — PHYSICAL EXAM
[General Appearance - Well Developed] : well developed [General Appearance - Well Nourished] : well nourished [Abdomen Soft] : soft [Abdomen Tenderness] : non-tender [Abdomen Mass (___ Cm)] : no abdominal mass palpated [Penis Abnormality] : normal uncircumcised penis [Urinary Bladder Findings] : the bladder was normal on palpation [Scrotum] : the scrotum was normal [Edema] : no peripheral edema [Exaggerated Use Of Accessory Muscles For Inspiration] : no accessory muscle use [] : no respiratory distress [Oriented To Time, Place, And Person] : oriented to person, place, and time [No Focal Deficits] : no focal deficits

## 2020-01-17 NOTE — HISTORY OF PRESENT ILLNESS
[FreeTextEntry1] : Patient seen for evaluation of urinary issues, specifically frequency. he notes progressively over past 2 years increased daytime frequency with urgency though leakage associated with nocturia 2-3 times. The FOS is decent without hesitancy, intermittency or straining. He has no h/o UTIs or retention.\par he has prior h/o microscopic hematuria with negative W/U including cystoscopy. \par No prior issues with elevated PSA. by TRUS prostate 41cc\par Trial Tamsulosin - didn’t see a benefit and if anything made the nocturia worse - 3-5 times. after a few months so stopped. symptoms about the same; Tried Rapaflo = same result\par  if smokes marijuana nocturia times 2. \par urgency the most bothersome. \par PVR 30cc.

## 2020-06-18 ENCOUNTER — APPOINTMENT (OUTPATIENT)
Dept: HEPATOLOGY | Facility: CLINIC | Age: 75
End: 2020-06-18
Payer: MEDICARE

## 2020-06-18 VITALS
WEIGHT: 198 LBS | HEIGHT: 69 IN | HEART RATE: 56 BPM | BODY MASS INDEX: 29.33 KG/M2 | TEMPERATURE: 97 F | DIASTOLIC BLOOD PRESSURE: 70 MMHG | SYSTOLIC BLOOD PRESSURE: 153 MMHG | RESPIRATION RATE: 18 BRPM

## 2020-06-18 PROCEDURE — 99214 OFFICE O/P EST MOD 30 MIN: CPT

## 2020-06-19 LAB
ALBUMIN SERPL ELPH-MCNC: 4.1 G/DL
ALP BLD-CCNC: 118 U/L
ALT SERPL-CCNC: 9 U/L
ANION GAP SERPL CALC-SCNC: 10 MMOL/L
AST SERPL-CCNC: 12 U/L
BILIRUB SERPL-MCNC: 0.7 MG/DL
BUN SERPL-MCNC: 14 MG/DL
CALCIUM SERPL-MCNC: 9.4 MG/DL
CHLORIDE SERPL-SCNC: 106 MMOL/L
CO2 SERPL-SCNC: 24 MMOL/L
CREAT SERPL-MCNC: 1.57 MG/DL
HCV RNA SERPL NAA DL=5-ACNC: NOT DETECTED IU/ML
HCV RNA SERPL NAA+PROBE-LOG IU: NOT DETECTED LOG10IU/ML
POTASSIUM SERPL-SCNC: 4.5 MMOL/L
PROT SERPL-MCNC: 6.5 G/DL
SODIUM SERPL-SCNC: 141 MMOL/L

## 2020-09-08 ENCOUNTER — OUTPATIENT (OUTPATIENT)
Dept: OUTPATIENT SERVICES | Facility: HOSPITAL | Age: 75
LOS: 1 days | Discharge: HOME | End: 2020-09-08

## 2020-09-08 DIAGNOSIS — D61.9 APLASTIC ANEMIA, UNSPECIFIED: ICD-10-CM

## 2020-09-10 ENCOUNTER — RESULT REVIEW (OUTPATIENT)
Age: 75
End: 2020-09-10

## 2020-09-10 ENCOUNTER — APPOINTMENT (OUTPATIENT)
Dept: HEMATOLOGY ONCOLOGY | Facility: CLINIC | Age: 75
End: 2020-09-10
Payer: MEDICARE

## 2020-09-10 VITALS
OXYGEN SATURATION: 99 % | DIASTOLIC BLOOD PRESSURE: 77 MMHG | TEMPERATURE: 99 F | RESPIRATION RATE: 16 BRPM | HEIGHT: 68.98 IN | BODY MASS INDEX: 30.04 KG/M2 | WEIGHT: 202.83 LBS | HEART RATE: 88 BPM | SYSTOLIC BLOOD PRESSURE: 138 MMHG

## 2020-09-10 LAB
BASOPHILS # BLD AUTO: 0.02 K/UL — SIGNIFICANT CHANGE UP (ref 0–0.2)
BASOPHILS NFR BLD AUTO: 0.4 % — SIGNIFICANT CHANGE UP (ref 0–2)
EOSINOPHIL # BLD AUTO: 0.03 K/UL — SIGNIFICANT CHANGE UP (ref 0–0.5)
EOSINOPHIL NFR BLD AUTO: 0.7 % — SIGNIFICANT CHANGE UP (ref 0–6)
FERRITIN SERPL-MCNC: 452 NG/ML
HCT VFR BLD CALC: 42.2 % — SIGNIFICANT CHANGE UP (ref 39–50)
HGB BLD-MCNC: 14.2 G/DL — SIGNIFICANT CHANGE UP (ref 13–17)
IMM GRANULOCYTES NFR BLD AUTO: 0.7 % — SIGNIFICANT CHANGE UP (ref 0–1.5)
LYMPHOCYTES # BLD AUTO: 1.7 K/UL — SIGNIFICANT CHANGE UP (ref 1–3.3)
LYMPHOCYTES # BLD AUTO: 37 % — SIGNIFICANT CHANGE UP (ref 13–44)
MCHC RBC-ENTMCNC: 33.6 G/DL — SIGNIFICANT CHANGE UP (ref 32–36)
MCHC RBC-ENTMCNC: 33.8 PG — SIGNIFICANT CHANGE UP (ref 27–34)
MCV RBC AUTO: 100.5 FL — HIGH (ref 80–100)
MONOCYTES # BLD AUTO: 0.51 K/UL — SIGNIFICANT CHANGE UP (ref 0–0.9)
MONOCYTES NFR BLD AUTO: 11.1 % — SIGNIFICANT CHANGE UP (ref 2–14)
NEUTROPHILS # BLD AUTO: 2.3 K/UL — SIGNIFICANT CHANGE UP (ref 1.8–7.4)
NEUTROPHILS NFR BLD AUTO: 50.1 % — SIGNIFICANT CHANGE UP (ref 43–77)
NRBC # BLD: 0 /100 WBCS — SIGNIFICANT CHANGE UP (ref 0–0)
PLATELET # BLD AUTO: 110 K/UL — LOW (ref 150–400)
RBC # BLD: 4.2 M/UL — SIGNIFICANT CHANGE UP (ref 4.2–5.8)
RBC # FLD: 12 % — SIGNIFICANT CHANGE UP (ref 10.3–14.5)
WBC # BLD: 4.59 K/UL — SIGNIFICANT CHANGE UP (ref 3.8–10.5)
WBC # FLD AUTO: 4.59 K/UL — SIGNIFICANT CHANGE UP (ref 3.8–10.5)

## 2020-09-10 PROCEDURE — 99213 OFFICE O/P EST LOW 20 MIN: CPT

## 2020-09-10 RX ORDER — TAMSULOSIN HYDROCHLORIDE 0.4 MG/1
0.4 CAPSULE ORAL
Qty: 30 | Refills: 5 | Status: DISCONTINUED | COMMUNITY
Start: 2018-12-14 | End: 2020-09-10

## 2020-09-10 RX ORDER — METOPROLOL SUCCINATE 25 MG/1
25 TABLET, EXTENDED RELEASE ORAL
Qty: 90 | Refills: 0 | Status: DISCONTINUED | COMMUNITY
Start: 2017-07-19 | End: 2020-09-10

## 2020-09-10 RX ORDER — AMLODIPINE BESYLATE 5 MG/1
5 TABLET ORAL DAILY
Qty: 30 | Refills: 0 | Status: DISCONTINUED | COMMUNITY
Start: 2017-02-10 | End: 2020-09-10
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
## 2020-09-10 NOTE — REVIEW OF SYSTEMS
[Negative] : Allergic/Immunologic [FreeTextEntry9] : mild RT hip pain.  Patient continues to see chiropractor. Exercises on his own. [FreeTextEntry4] : L ear tinnitus - chronic without change. MRIs negative 2015. [de-identified] : tinnitus left ear.

## 2020-09-14 LAB — GASTRIN SERPL-MCNC: 56 PG/ML

## 2021-03-07 ENCOUNTER — OUTPATIENT (OUTPATIENT)
Dept: OUTPATIENT SERVICES | Facility: HOSPITAL | Age: 76
LOS: 1 days | Discharge: HOME | End: 2021-03-07

## 2021-03-07 DIAGNOSIS — D61.9 APLASTIC ANEMIA, UNSPECIFIED: ICD-10-CM

## 2021-03-11 ENCOUNTER — RESULT REVIEW (OUTPATIENT)
Age: 76
End: 2021-03-11

## 2021-03-11 ENCOUNTER — APPOINTMENT (OUTPATIENT)
Dept: HEMATOLOGY ONCOLOGY | Facility: CLINIC | Age: 76
End: 2021-03-11
Payer: MEDICARE

## 2021-03-11 VITALS
RESPIRATION RATE: 18 BRPM | TEMPERATURE: 97.6 F | WEIGHT: 207.23 LBS | OXYGEN SATURATION: 99 % | DIASTOLIC BLOOD PRESSURE: 83 MMHG | SYSTOLIC BLOOD PRESSURE: 146 MMHG | HEART RATE: 68 BPM | BODY MASS INDEX: 30.69 KG/M2 | HEIGHT: 68.98 IN

## 2021-03-11 LAB
25(OH)D3 SERPL-MCNC: 58 NG/ML
BASOPHILS # BLD AUTO: 0.01 K/UL — SIGNIFICANT CHANGE UP (ref 0–0.2)
BASOPHILS NFR BLD AUTO: 0.2 % — SIGNIFICANT CHANGE UP (ref 0–2)
EOSINOPHIL # BLD AUTO: 0.08 K/UL — SIGNIFICANT CHANGE UP (ref 0–0.5)
EOSINOPHIL NFR BLD AUTO: 1.4 % — SIGNIFICANT CHANGE UP (ref 0–6)
FERRITIN SERPL-MCNC: 470 NG/ML
HCT VFR BLD CALC: 41.4 % — SIGNIFICANT CHANGE UP (ref 39–50)
HGB BLD-MCNC: 13.8 G/DL — SIGNIFICANT CHANGE UP (ref 13–17)
IMM GRANULOCYTES NFR BLD AUTO: 0.3 % — SIGNIFICANT CHANGE UP (ref 0–1.5)
LYMPHOCYTES # BLD AUTO: 1.79 K/UL — SIGNIFICANT CHANGE UP (ref 1–3.3)
LYMPHOCYTES # BLD AUTO: 31.1 % — SIGNIFICANT CHANGE UP (ref 13–44)
MCHC RBC-ENTMCNC: 33.3 G/DL — SIGNIFICANT CHANGE UP (ref 32–36)
MCHC RBC-ENTMCNC: 33.8 PG — SIGNIFICANT CHANGE UP (ref 27–34)
MCV RBC AUTO: 101.5 FL — HIGH (ref 80–100)
MONOCYTES # BLD AUTO: 0.51 K/UL — SIGNIFICANT CHANGE UP (ref 0–0.9)
MONOCYTES NFR BLD AUTO: 8.9 % — SIGNIFICANT CHANGE UP (ref 2–14)
NEUTROPHILS # BLD AUTO: 3.34 K/UL — SIGNIFICANT CHANGE UP (ref 1.8–7.4)
NEUTROPHILS NFR BLD AUTO: 58.1 % — SIGNIFICANT CHANGE UP (ref 43–77)
NRBC # BLD: 0 /100 WBCS — SIGNIFICANT CHANGE UP (ref 0–0)
PLATELET # BLD AUTO: 100 K/UL — LOW (ref 150–400)
RBC # BLD: 4.08 M/UL — LOW (ref 4.2–5.8)
RBC # FLD: 12.3 % — SIGNIFICANT CHANGE UP (ref 10.3–14.5)
WBC # BLD: 5.75 K/UL — SIGNIFICANT CHANGE UP (ref 3.8–10.5)
WBC # FLD AUTO: 5.75 K/UL — SIGNIFICANT CHANGE UP (ref 3.8–10.5)

## 2021-03-11 PROCEDURE — 99214 OFFICE O/P EST MOD 30 MIN: CPT

## 2021-03-11 NOTE — REVIEW OF SYSTEMS
[Negative] : Allergic/Immunologic [FreeTextEntry4] : L ear tinnitus - chronic without change. MRIs negative 2015. [FreeTextEntry9] : mild RT hip pain.  Patient continues to see chiropractor. Exercises on his own. [de-identified] : tinnitus left ear.
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
## 2021-03-12 LAB
ALBUMIN SERPL ELPH-MCNC: 4.2 G/DL
ALP BLD-CCNC: 114 U/L
ALT SERPL-CCNC: 9 U/L
ANION GAP SERPL CALC-SCNC: 14 MMOL/L
AST SERPL-CCNC: 16 U/L
BILIRUB SERPL-MCNC: 0.7 MG/DL
BUN SERPL-MCNC: 11 MG/DL
CALCIUM SERPL-MCNC: 9.7 MG/DL
CHLORIDE SERPL-SCNC: 109 MMOL/L
CO2 SERPL-SCNC: 20 MMOL/L
CREAT SERPL-MCNC: 1.46 MG/DL
GLUCOSE SERPL-MCNC: 113 MG/DL
LDH SERPL-CCNC: 188 U/L
POTASSIUM SERPL-SCNC: 4.6 MMOL/L
PROT SERPL-MCNC: 7.2 G/DL
SODIUM SERPL-SCNC: 144 MMOL/L

## 2021-03-18 LAB — GASTRIN SERPL-MCNC: 47 PG/ML

## 2021-05-03 ENCOUNTER — NON-APPOINTMENT (OUTPATIENT)
Age: 76
End: 2021-05-03

## 2021-05-05 LAB
APPEARANCE: ABNORMAL
BACTERIA UR CULT: NORMAL
BACTERIA: NEGATIVE
BILIRUBIN URINE: NEGATIVE
BLOOD URINE: ABNORMAL
COLOR: ABNORMAL
GLUCOSE QUALITATIVE U: NEGATIVE
HYALINE CASTS: 0 /LPF
KETONES URINE: NEGATIVE
LEUKOCYTE ESTERASE URINE: NEGATIVE
MICROSCOPIC-UA: NORMAL
NITRITE URINE: NEGATIVE
PH URINE: 6.5
PROTEIN URINE: ABNORMAL
RED BLOOD CELLS URINE: >720 /HPF
SPECIFIC GRAVITY URINE: 1.02
SQUAMOUS EPITHELIAL CELLS: 0 /HPF
UROBILINOGEN URINE: NORMAL
WHITE BLOOD CELLS URINE: 5 /HPF

## 2021-05-07 ENCOUNTER — APPOINTMENT (OUTPATIENT)
Dept: UROLOGY | Facility: CLINIC | Age: 76
End: 2021-05-07
Payer: MEDICARE

## 2021-05-07 PROCEDURE — 99213 OFFICE O/P EST LOW 20 MIN: CPT

## 2021-05-07 NOTE — HISTORY OF PRESENT ILLNESS
[FreeTextEntry1] : Patient seen for evaluation of urinary issues, specifically frequency. he notes progressively over past 2 years increased daytime frequency with urgency though leakage associated with nocturia 2-3 times. The FOS is decent without hesitancy, intermittency or straining. He has no h/o UTIs or retention.\par he has prior h/o microscopic hematuria with negative W/U including cystoscopy. \par No prior issues with elevated PSA. by TRUS prostate 41cc\par Trial Tamsulosin - didn’t see a benefit and if anything made the nocturia worse - 3-5 times. after a few months so stopped. symptoms about the same; Tried Rapaflo = same result\par  if smokes marijuana nocturia times 2. \par urgency the most bothersome. PVR 30cc. \par \par here after having gross hematuria earlier this week - 2 voids; no clots, flank or back pain, dysuria or change in baseline LUTs.

## 2021-05-14 ENCOUNTER — APPOINTMENT (OUTPATIENT)
Dept: CT IMAGING | Facility: IMAGING CENTER | Age: 76
End: 2021-05-14
Payer: MEDICARE

## 2021-05-14 ENCOUNTER — OUTPATIENT (OUTPATIENT)
Dept: OUTPATIENT SERVICES | Facility: HOSPITAL | Age: 76
LOS: 1 days | End: 2021-05-14
Payer: MEDICARE

## 2021-05-14 DIAGNOSIS — R31.9 HEMATURIA, UNSPECIFIED: ICD-10-CM

## 2021-05-14 PROCEDURE — 74178 CT ABD&PLV WO CNTR FLWD CNTR: CPT | Mod: 26,MH

## 2021-05-14 PROCEDURE — 82565 ASSAY OF CREATININE: CPT

## 2021-05-14 PROCEDURE — 74178 CT ABD&PLV WO CNTR FLWD CNTR: CPT

## 2021-05-26 ENCOUNTER — APPOINTMENT (OUTPATIENT)
Dept: UROLOGY | Facility: CLINIC | Age: 76
End: 2021-05-26
Payer: MEDICARE

## 2021-05-26 ENCOUNTER — OUTPATIENT (OUTPATIENT)
Dept: OUTPATIENT SERVICES | Facility: HOSPITAL | Age: 76
LOS: 1 days | End: 2021-05-26
Payer: MEDICARE

## 2021-05-26 VITALS
TEMPERATURE: 97.7 F | HEART RATE: 96 BPM | DIASTOLIC BLOOD PRESSURE: 91 MMHG | SYSTOLIC BLOOD PRESSURE: 153 MMHG | RESPIRATION RATE: 18 BRPM

## 2021-05-26 DIAGNOSIS — R31.9 HEMATURIA, UNSPECIFIED: ICD-10-CM

## 2021-05-26 DIAGNOSIS — R35.0 FREQUENCY OF MICTURITION: ICD-10-CM

## 2021-05-26 PROCEDURE — 52000 CYSTOURETHROSCOPY: CPT

## 2021-05-28 ENCOUNTER — APPOINTMENT (OUTPATIENT)
Dept: ORTHOPEDIC SURGERY | Facility: CLINIC | Age: 76
End: 2021-05-28
Payer: MEDICARE

## 2021-05-28 DIAGNOSIS — M87.051 IDIOPATHIC ASEPTIC NECROSIS OF RIGHT FEMUR: ICD-10-CM

## 2021-05-28 PROCEDURE — 99212 OFFICE O/P EST SF 10 MIN: CPT

## 2021-05-28 NOTE — CONSULT LETTER
[Dear  ___] : Dear  [unfilled], [Consult Letter:] : I had the pleasure of evaluating your patient, [unfilled]. [Consult Closing:] : Thank you very much for allowing me to participate in the care of this patient.  If you have any questions, please do not hesitate to contact me. [Sincerely,] : Sincerely, [FreeTextEntry2] : ANTONIO SAUNDERS [FreeTextEntry3] : Caio Gamble MD, FAAOS\par Total Hip and Total Knee Replacement \par Anterior Total Hip Replacement\par [facility]\par [address]\par [place], [place] \par [phone]\par fax [phone]\par

## 2021-05-28 NOTE — PHYSICAL EXAM
[de-identified] : Well-nourished, in no acute distress\par Alert and oriented to time, place and person\par Skin: no lesions discoloration\par Respirations: unlabored\par Cardiac: no leg swelling\par Lymphatic: no groin adenopathy\par right hip: flexion 105, internal 5, external 5, abduction 40 degrees, abduction 30 degrees pain free\par Left hip passive range of motion satisfactory pain-free [de-identified] : Prior x-rays AP Pelvis and AP/Lateral Xrays of the  hip taken in the office today demonstrates left hip satisfactory appearance right thr components and alignment. Right hip AVN right femoral head with subchondral collapse. and cysts

## 2021-05-28 NOTE — DISCUSSION/SUMMARY
[de-identified] : The patient presents s/p left thr and intermittent right hip pain. secondary to AVN. quality of life still acceptable and therefore not a candidate for right total hip replacement\par \par Plan:\par FU 1 year. Continue chiropractor

## 2021-05-28 NOTE — HISTORY OF PRESENT ILLNESS
[de-identified] : 75 year old male presents with chronic episodic right hip pain.  Pain provoked by lifting heavy objects and over exercise patient undergoes periodic chiropractic treatment every few weeks for low back and right groin pain.  In between chiropractic treatment patient ports that he "knows what to do to loosen my hip up".  He does morning exercises known history of Avascular necrosis. s/p left thr 2010.pain and symptom free.  History of hepatitis treatment.medically treated. developed secondary aplastic anemia  secondary to allergy to interferon Has undergone chiropractic treatment in the past. \par \par

## 2021-06-24 ENCOUNTER — APPOINTMENT (OUTPATIENT)
Dept: HEPATOLOGY | Facility: CLINIC | Age: 76
End: 2021-06-24
Payer: MEDICARE

## 2021-06-24 VITALS
DIASTOLIC BLOOD PRESSURE: 80 MMHG | SYSTOLIC BLOOD PRESSURE: 134 MMHG | OXYGEN SATURATION: 100 % | TEMPERATURE: 97 F | WEIGHT: 206 LBS | HEART RATE: 73 BPM | BODY MASS INDEX: 31.22 KG/M2 | HEIGHT: 68 IN | RESPIRATION RATE: 12 BRPM

## 2021-06-24 PROCEDURE — 91200 LIVER ELASTOGRAPHY: CPT

## 2021-06-24 PROCEDURE — 99214 OFFICE O/P EST MOD 30 MIN: CPT

## 2021-09-20 ENCOUNTER — OUTPATIENT (OUTPATIENT)
Dept: OUTPATIENT SERVICES | Facility: HOSPITAL | Age: 76
LOS: 1 days | Discharge: HOME | End: 2021-09-20

## 2021-09-20 DIAGNOSIS — D61.9 APLASTIC ANEMIA, UNSPECIFIED: ICD-10-CM

## 2021-09-21 ENCOUNTER — APPOINTMENT (OUTPATIENT)
Dept: HEMATOLOGY ONCOLOGY | Facility: CLINIC | Age: 76
End: 2021-09-21
Payer: MEDICARE

## 2021-09-21 ENCOUNTER — RESULT REVIEW (OUTPATIENT)
Age: 76
End: 2021-09-21

## 2021-09-21 VITALS
HEART RATE: 60 BPM | DIASTOLIC BLOOD PRESSURE: 84 MMHG | TEMPERATURE: 97.8 F | OXYGEN SATURATION: 97 % | WEIGHT: 205.03 LBS | SYSTOLIC BLOOD PRESSURE: 147 MMHG | HEIGHT: 67.99 IN | BODY MASS INDEX: 31.07 KG/M2 | RESPIRATION RATE: 16 BRPM

## 2021-09-21 LAB
BASOPHILS # BLD AUTO: 0.02 K/UL — SIGNIFICANT CHANGE UP (ref 0–0.2)
BASOPHILS NFR BLD AUTO: 0.4 % — SIGNIFICANT CHANGE UP (ref 0–2)
EOSINOPHIL # BLD AUTO: 0.05 K/UL — SIGNIFICANT CHANGE UP (ref 0–0.5)
EOSINOPHIL NFR BLD AUTO: 1 % — SIGNIFICANT CHANGE UP (ref 0–6)
HCT VFR BLD CALC: 43.1 % — SIGNIFICANT CHANGE UP (ref 39–50)
HGB BLD-MCNC: 14.5 G/DL — SIGNIFICANT CHANGE UP (ref 13–17)
IMM GRANULOCYTES NFR BLD AUTO: 0.4 % — SIGNIFICANT CHANGE UP (ref 0–1.5)
LYMPHOCYTES # BLD AUTO: 2.04 K/UL — SIGNIFICANT CHANGE UP (ref 1–3.3)
LYMPHOCYTES # BLD AUTO: 42 % — SIGNIFICANT CHANGE UP (ref 13–44)
MCHC RBC-ENTMCNC: 33.6 G/DL — SIGNIFICANT CHANGE UP (ref 32–36)
MCHC RBC-ENTMCNC: 34.1 PG — HIGH (ref 27–34)
MCV RBC AUTO: 101.4 FL — HIGH (ref 80–100)
MONOCYTES # BLD AUTO: 0.55 K/UL — SIGNIFICANT CHANGE UP (ref 0–0.9)
MONOCYTES NFR BLD AUTO: 11.3 % — SIGNIFICANT CHANGE UP (ref 2–14)
NEUTROPHILS # BLD AUTO: 2.18 K/UL — SIGNIFICANT CHANGE UP (ref 1.8–7.4)
NEUTROPHILS NFR BLD AUTO: 44.9 % — SIGNIFICANT CHANGE UP (ref 43–77)
NRBC # BLD: 0 /100 WBCS — SIGNIFICANT CHANGE UP (ref 0–0)
PLATELET # BLD AUTO: 105 K/UL — LOW (ref 150–400)
RBC # BLD: 4.25 M/UL — SIGNIFICANT CHANGE UP (ref 4.2–5.8)
RBC # FLD: 12.6 % — SIGNIFICANT CHANGE UP (ref 10.3–14.5)
WBC # BLD: 4.86 K/UL — SIGNIFICANT CHANGE UP (ref 3.8–10.5)
WBC # FLD AUTO: 4.86 K/UL — SIGNIFICANT CHANGE UP (ref 3.8–10.5)

## 2021-09-21 PROCEDURE — 99213 OFFICE O/P EST LOW 20 MIN: CPT
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
## 2021-09-21 NOTE — REVIEW OF SYSTEMS
[Negative] : Allergic/Immunologic [FreeTextEntry4] : L ear tinnitus - chronic without change. MRIs negative 2015. [FreeTextEntry9] : mild RT hip pain.  Patient continues to see chiropractor. Exercises on his own. [de-identified] : tinnitus left ear.

## 2021-09-22 LAB
ALBUMIN SERPL ELPH-MCNC: 4.3 G/DL
ALP BLD-CCNC: 97 U/L
ALT SERPL-CCNC: 19 U/L
ANION GAP SERPL CALC-SCNC: 11 MMOL/L
AST SERPL-CCNC: 27 U/L
BILIRUB SERPL-MCNC: 0.6 MG/DL
BUN SERPL-MCNC: 17 MG/DL
CALCIUM SERPL-MCNC: 10 MG/DL
CHLORIDE SERPL-SCNC: 103 MMOL/L
CO2 SERPL-SCNC: 23 MMOL/L
COVID-19 NUCLEOCAPSID  GAM ANTIBODY INTERPRETATION: NEGATIVE
COVID-19 SPIKE DOMAIN ANTIBODY INTERPRETATION: POSITIVE
CREAT SERPL-MCNC: 1.6 MG/DL
DEPRECATED KAPPA LC FREE/LAMBDA SER: 1.11 RATIO
FERRITIN SERPL-MCNC: 431 NG/ML
GLUCOSE SERPL-MCNC: 85 MG/DL
IGA SER QL IEP: 230 MG/DL
IGG SER QL IEP: 1292 MG/DL
IGM SER QL IEP: 220 MG/DL
KAPPA LC CSF-MCNC: 1.71 MG/DL
KAPPA LC SERPL-MCNC: 1.89 MG/DL
LDH SERPL-CCNC: 197 U/L
POTASSIUM SERPL-SCNC: 5.4 MMOL/L
PROT SERPL-MCNC: 7.1 G/DL
SARS-COV-2 AB SERPL IA-ACNC: >250 U/ML
SARS-COV-2 AB SERPL QL IA: 0.09 INDEX
SODIUM SERPL-SCNC: 137 MMOL/L

## 2021-09-27 LAB — GASTRIN SERPL-MCNC: 49 PG/ML

## 2021-10-22 ENCOUNTER — APPOINTMENT (OUTPATIENT)
Dept: UROLOGY | Facility: CLINIC | Age: 76
End: 2021-10-22
Payer: MEDICARE

## 2021-10-22 VITALS
TEMPERATURE: 98.2 F | RESPIRATION RATE: 16 BRPM | DIASTOLIC BLOOD PRESSURE: 86 MMHG | SYSTOLIC BLOOD PRESSURE: 138 MMHG | OXYGEN SATURATION: 99 % | HEART RATE: 76 BPM | BODY MASS INDEX: 31.07 KG/M2 | HEIGHT: 68 IN | WEIGHT: 205 LBS

## 2021-10-22 PROCEDURE — 99213 OFFICE O/P EST LOW 20 MIN: CPT

## 2021-10-22 NOTE — HISTORY OF PRESENT ILLNESS
[FreeTextEntry1] : Patient seen for evaluation of urinary issues, specifically frequency. he notes progressively over past 2 years increased daytime frequency with urgency though leakage associated with nocturia 2-3 times. The FOS is decent without hesitancy, intermittency or straining. He has no h/o UTIs or retention.\par he has prior h/o microscopic hematuria with negative W/U including cystoscopy. \par No prior issues with elevated PSA. by TRUS prostate 41cc\par Trial Tamsulosin - didn’t see a benefit and if anything made the nocturia worse - 3-5 times. after a few months so stopped. symptoms about the same; Tried Rapaflo = same result\par  if smokes marijuana nocturia times 2. \par urgency the most bothersome. PVR 30cc. \par \par here after having gross hematuria earlier this week - 2 voids; no clots, flank or back pain, dysuria or change in baseline LUTs. \par still having episodes of gross hematuria 0 usually day after masturbation. \par still having LUTs as above

## 2021-10-25 LAB
APPEARANCE: CLEAR
BACTERIA: NEGATIVE
BILIRUBIN URINE: NEGATIVE
BLOOD URINE: NEGATIVE
CALCIUM OXALATE CRYSTALS: ABNORMAL
COLOR: YELLOW
GLUCOSE QUALITATIVE U: NEGATIVE
HYALINE CASTS: 0 /LPF
KETONES URINE: NEGATIVE
LEUKOCYTE ESTERASE URINE: NEGATIVE
MICROSCOPIC-UA: NORMAL
NITRITE URINE: NEGATIVE
PH URINE: 6
PROTEIN URINE: NEGATIVE
RED BLOOD CELLS URINE: 1 /HPF
SPECIFIC GRAVITY URINE: 1.02
SQUAMOUS EPITHELIAL CELLS: 0 /HPF
UROBILINOGEN URINE: NORMAL
WHITE BLOOD CELLS URINE: 3 /HPF

## 2022-03-03 ENCOUNTER — APPOINTMENT (OUTPATIENT)
Dept: UROLOGY | Facility: CLINIC | Age: 77
End: 2022-03-03
Payer: MEDICARE

## 2022-03-03 VITALS
HEART RATE: 74 BPM | TEMPERATURE: 97.7 F | OXYGEN SATURATION: 99 % | SYSTOLIC BLOOD PRESSURE: 130 MMHG | WEIGHT: 205 LBS | DIASTOLIC BLOOD PRESSURE: 81 MMHG | RESPIRATION RATE: 15 BRPM | HEIGHT: 68 IN | BODY MASS INDEX: 31.07 KG/M2

## 2022-03-03 DIAGNOSIS — N28.1 CYST OF KIDNEY, ACQUIRED: ICD-10-CM

## 2022-03-03 PROCEDURE — 99214 OFFICE O/P EST MOD 30 MIN: CPT

## 2022-03-03 RX ORDER — FINASTERIDE 5 MG/1
5 TABLET, FILM COATED ORAL
Qty: 90 | Refills: 3 | Status: DISCONTINUED | COMMUNITY
Start: 2021-10-22 | End: 2022-03-03

## 2022-03-03 NOTE — HISTORY OF PRESENT ILLNESS
[FreeTextEntry1] : He is a 76-year-old man who is seen today for several urological issues as a second opinion.  He usually has nocturia 2-3 times.  He does not have significant urinary symptoms in the daytime.  He usually tries to limit fluids before bedtime.  He has tried tamsulosin, Rapaflo and finasteride without success and he is not using them at this time.  Patient states that urinary symptoms do not bother him significantly however.  Also he complains of blood in urine after masturbation.  When he stops masturbating there is no blood in the urine.  He has undergone work-up with cystoscopy and CT scan.  He does not smoke cigarettes.  At some point he was prescribed tadalafil 5 mg.  Cystoscopy in May 2021 showed enlarged prostate.  His prostate has measured about 40 g.  Residual urine volume today was minimal.  CT scan in June 2021 showed several nonobstructing stones in the left kidney up to 7 mm, 6.4 cm left parapelvic renal cyst and small right renal cyst.  In 2021, urinalysis showed white and red blood cells and urine culture was negative.  Creatinine was 1.6.  In June 2021, PSA level was 1.1.

## 2022-03-03 NOTE — PHYSICAL EXAM
[General Appearance - Well Developed] : well developed [General Appearance - Well Nourished] : well nourished [Normal Appearance] : normal appearance [Well Groomed] : well groomed [General Appearance - In No Acute Distress] : no acute distress [Abdomen Soft] : soft [Abdomen Tenderness] : non-tender [Costovertebral Angle Tenderness] : no ~M costovertebral angle tenderness [FreeTextEntry1] : Patient states that genital exam was performed recently. [] : no respiratory distress [Respiration, Rhythm And Depth] : normal respiratory rhythm and effort [Exaggerated Use Of Accessory Muscles For Inspiration] : no accessory muscle use [Oriented To Time, Place, And Person] : oriented to person, place, and time [Affect] : the affect was normal [Mood] : the mood was normal [Not Anxious] : not anxious

## 2022-03-03 NOTE — LETTER BODY
[Dear  ___] : Dear  [unfilled], [Consult Letter:] : I had the pleasure of evaluating your patient, [unfilled]. [Consult Closing:] : Thank you very much for allowing me to participate in the care of this patient.  If you have any questions, please do not hesitate to contact me. [FreeTextEntry1] : [facility]\par [address] \[place], [place], [address]  \par [phone]

## 2022-04-05 ENCOUNTER — OUTPATIENT (OUTPATIENT)
Dept: OUTPATIENT SERVICES | Facility: HOSPITAL | Age: 77
LOS: 1 days | Discharge: HOME | End: 2022-04-05

## 2022-04-05 DIAGNOSIS — D61.9 APLASTIC ANEMIA, UNSPECIFIED: ICD-10-CM

## 2022-04-07 ENCOUNTER — APPOINTMENT (OUTPATIENT)
Dept: OTOLARYNGOLOGY | Facility: CLINIC | Age: 77
End: 2022-04-07
Payer: MEDICARE

## 2022-04-07 VITALS
HEART RATE: 54 BPM | SYSTOLIC BLOOD PRESSURE: 150 MMHG | DIASTOLIC BLOOD PRESSURE: 84 MMHG | HEIGHT: 69 IN | WEIGHT: 203 LBS | BODY MASS INDEX: 30.07 KG/M2 | TEMPERATURE: 98 F

## 2022-04-07 DIAGNOSIS — J34.2 DEVIATED NASAL SEPTUM: ICD-10-CM

## 2022-04-07 PROCEDURE — 31575 DIAGNOSTIC LARYNGOSCOPY: CPT

## 2022-04-07 PROCEDURE — 99204 OFFICE O/P NEW MOD 45 MIN: CPT | Mod: 25

## 2022-04-07 NOTE — END OF VISIT
[FreeTextEntry3] : I saw and examined this patient in person. I have discussed with Elayne Linda, Physician Assistant, in detail the above note and agree with the above assessment and plan of care.\par

## 2022-04-07 NOTE — HISTORY OF PRESENT ILLNESS
[de-identified] : Patient is here today with an intermittent tightness in the throat as the day progresses. he takes cough drops and this does help him temporarily. he is concerned as he has a family history of throat cancer. He does not have pain and is able to eat, drink and swallowing.  He does have a history of smoking as well. He does not have current nasal congestion or runny nose. He denies nosebleeds.

## 2022-04-07 NOTE — ASSESSMENT
[FreeTextEntry1] : Patient is a former heavy smoker having some throat discomfort was somewhat concerned wants to check make sure everything is okay does not does not have any real symptoms.  He is a occasional smoker at this time.  On examination fiberoptically his larynx is perfectly normal he was reassured there is no evidence of any tumors masses or any growths I have encouraged him to use Flonase nasal spray that can help him symptomatically otherwise no further acute interventions indicated at this time.

## 2022-04-07 NOTE — CONSULT LETTER
[Dear  ___] : Dear  [unfilled], [Consult Letter:] : I had the pleasure of evaluating your patient, [unfilled]. [Please see my note below.] : Please see my note below. [Consult Closing:] : Thank you very much for allowing me to participate in the care of this patient.  If you have any questions, please do not hesitate to contact me. [Sincerely,] : Sincerely, [FreeTextEntry3] : Dangelo Cabrera MD\par [facility]\par Otolaryngology and Facial Plastics\par [employer], [employer]\par

## 2022-04-08 ENCOUNTER — RESULT REVIEW (OUTPATIENT)
Age: 77
End: 2022-04-08

## 2022-04-08 ENCOUNTER — APPOINTMENT (OUTPATIENT)
Dept: HEMATOLOGY ONCOLOGY | Facility: CLINIC | Age: 77
End: 2022-04-08
Payer: MEDICARE

## 2022-04-08 VITALS
BODY MASS INDEX: 30.07 KG/M2 | DIASTOLIC BLOOD PRESSURE: 79 MMHG | WEIGHT: 203 LBS | TEMPERATURE: 96.8 F | HEART RATE: 76 BPM | HEIGHT: 69 IN | OXYGEN SATURATION: 99 % | RESPIRATION RATE: 16 BRPM | SYSTOLIC BLOOD PRESSURE: 131 MMHG

## 2022-04-08 LAB
BASOPHILS # BLD AUTO: 0.02 K/UL — SIGNIFICANT CHANGE UP (ref 0–0.2)
BASOPHILS NFR BLD AUTO: 0.4 % — SIGNIFICANT CHANGE UP (ref 0–2)
EOSINOPHIL # BLD AUTO: 0.06 K/UL — SIGNIFICANT CHANGE UP (ref 0–0.5)
EOSINOPHIL NFR BLD AUTO: 1.3 % — SIGNIFICANT CHANGE UP (ref 0–6)
HCT VFR BLD CALC: 42.1 % — SIGNIFICANT CHANGE UP (ref 39–50)
HGB BLD-MCNC: 14.2 G/DL — SIGNIFICANT CHANGE UP (ref 13–17)
IMM GRANULOCYTES NFR BLD AUTO: 0.2 % — SIGNIFICANT CHANGE UP (ref 0–1.5)
LYMPHOCYTES # BLD AUTO: 2.08 K/UL — SIGNIFICANT CHANGE UP (ref 1–3.3)
LYMPHOCYTES # BLD AUTO: 44.6 % — HIGH (ref 13–44)
MCHC RBC-ENTMCNC: 33.7 G/DL — SIGNIFICANT CHANGE UP (ref 32–36)
MCHC RBC-ENTMCNC: 34.5 PG — HIGH (ref 27–34)
MCV RBC AUTO: 102.4 FL — HIGH (ref 80–100)
MONOCYTES # BLD AUTO: 0.48 K/UL — SIGNIFICANT CHANGE UP (ref 0–0.9)
MONOCYTES NFR BLD AUTO: 10.3 % — SIGNIFICANT CHANGE UP (ref 2–14)
NEUTROPHILS # BLD AUTO: 2.01 K/UL — SIGNIFICANT CHANGE UP (ref 1.8–7.4)
NEUTROPHILS NFR BLD AUTO: 43.2 % — SIGNIFICANT CHANGE UP (ref 43–77)
NRBC # BLD: 0 /100 WBCS — SIGNIFICANT CHANGE UP (ref 0–0)
PLATELET # BLD AUTO: 94 K/UL — LOW (ref 150–400)
RBC # BLD: 4.11 M/UL — LOW (ref 4.2–5.8)
RBC # FLD: 13.1 % — SIGNIFICANT CHANGE UP (ref 10.3–14.5)
WBC # BLD: 4.66 K/UL — SIGNIFICANT CHANGE UP (ref 3.8–10.5)
WBC # FLD AUTO: 4.66 K/UL — SIGNIFICANT CHANGE UP (ref 3.8–10.5)

## 2022-04-08 PROCEDURE — 99214 OFFICE O/P EST MOD 30 MIN: CPT

## 2022-04-08 RX ORDER — METOPROLOL SUCCINATE 25 MG/1
25 TABLET, EXTENDED RELEASE ORAL DAILY
Refills: 0 | Status: DISCONTINUED | COMMUNITY
Start: 2021-03-11 | End: 2022-04-08

## 2022-04-08 NOTE — REVIEW OF SYSTEMS
[Negative] : Allergic/Immunologic [FreeTextEntry4] : L ear tinnitus - chronic without change. MRIs negative 2015. [FreeTextEntry9] : mild RT hip pain.  Patient continues to see chiropractor. Exercises on his own. [de-identified] : tinnitus left ear.
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
## 2022-04-08 NOTE — REVIEW OF SYSTEMS
[Negative] : Allergic/Immunologic [FreeTextEntry4] : L ear tinnitus - chronic without change. MRIs negative 2015. [FreeTextEntry9] : mild RT hip pain.  Patient continues to see chiropractor. Exercises on his own. [de-identified] : tinnitus left ear.
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
## 2022-04-09 LAB
ALBUMIN SERPL ELPH-MCNC: 4.3 G/DL
ALP BLD-CCNC: 136 U/L
ALT SERPL-CCNC: 18 U/L
ANION GAP SERPL CALC-SCNC: 11 MMOL/L
AST SERPL-CCNC: 22 U/L
BILIRUB SERPL-MCNC: 0.4 MG/DL
BUN SERPL-MCNC: 11 MG/DL
CALCIUM SERPL-MCNC: 9.6 MG/DL
CHLORIDE SERPL-SCNC: 107 MMOL/L
CO2 SERPL-SCNC: 23 MMOL/L
COVID-19 SPIKE DOMAIN ANTIBODY INTERPRETATION: POSITIVE
CREAT SERPL-MCNC: 1.37 MG/DL
EGFR: 53 ML/MIN/1.73M2
FERRITIN SERPL-MCNC: 560 NG/ML
GLUCOSE SERPL-MCNC: 99 MG/DL
POTASSIUM SERPL-SCNC: 4.8 MMOL/L
PROT SERPL-MCNC: 7.1 G/DL
SARS-COV-2 AB SERPL IA-ACNC: >250 U/ML
SODIUM SERPL-SCNC: 141 MMOL/L

## 2022-04-11 LAB
DEPRECATED KAPPA LC FREE/LAMBDA SER: 1.23 RATIO
GASTRIN SERPL-MCNC: 40 PG/ML
IGA SER QL IEP: 222 MG/DL
IGG SER QL IEP: 1175 MG/DL
IGM SER QL IEP: 225 MG/DL
KAPPA LC CSF-MCNC: 2.06 MG/DL
KAPPA LC SERPL-MCNC: 2.54 MG/DL

## 2022-04-22 ENCOUNTER — APPOINTMENT (OUTPATIENT)
Dept: UROLOGY | Facility: CLINIC | Age: 77
End: 2022-04-22

## 2022-05-04 ENCOUNTER — APPOINTMENT (OUTPATIENT)
Dept: UROLOGY | Facility: CLINIC | Age: 77
End: 2022-05-04
Payer: MEDICARE

## 2022-05-04 VITALS — HEART RATE: 64 BPM | DIASTOLIC BLOOD PRESSURE: 81 MMHG | SYSTOLIC BLOOD PRESSURE: 147 MMHG

## 2022-05-04 DIAGNOSIS — N20.0 CALCULUS OF KIDNEY: ICD-10-CM

## 2022-05-04 DIAGNOSIS — R35.0 BENIGN PROSTATIC HYPERPLASIA WITH LOWER URINARY TRACT SYMPMS: ICD-10-CM

## 2022-05-04 DIAGNOSIS — N40.1 BENIGN PROSTATIC HYPERPLASIA WITH LOWER URINARY TRACT SYMPMS: ICD-10-CM

## 2022-05-04 PROCEDURE — 99213 OFFICE O/P EST LOW 20 MIN: CPT

## 2022-05-04 NOTE — HISTORY OF PRESENT ILLNESS
[FreeTextEntry1] : Patient seen for evaluation of urinary issues, specifically frequency. he notes progressively over past 2 years increased daytime frequency with urgency though leakage associated with nocturia 2-3 times. The FOS is decent without hesitancy, intermittency or straining. He has no h/o UTIs or retention.\par he has prior h/o microscopic hematuria with negative W/U including cystoscopy. \par No prior issues with elevated PSA. by TRUS prostate 41cc\par Trial Tamsulosin - didn’t see a benefit and if anything made the nocturia worse - 3-5 times. after a few months so stopped. symptoms about the same; Tried Rapaflo = same result\par  if smokes marijuana nocturia times 2. \par urgency the most bothersome. PVR 30cc. \par \par here after having gross hematuria earlier this week - 2 voids; no clots, flank or back pain, dysuria or change in baseline LUTs. still having episodes of gross hematuria 0 usually day after masturbation. \par still having LUTs as above \par \par 5/22 - saw OR for second opinion-\par in the interim stayed on Cialis 5mg which has helped the nocturia which was not helped by any of the 2 alpha blockers he took. Stopped finasteride due to the side effects. generally  only waking at 5am then when time to get up. Deals with the daytime urgency.\par No further hematuria as has stopped masturbation.

## 2022-05-04 NOTE — ASSESSMENT
[FreeTextEntry1] : stay on daily Cialis for his LUTs as only medication that has worked.\par f/u 6 months with mary GÓMEZ

## 2022-06-01 ENCOUNTER — APPOINTMENT (OUTPATIENT)
Dept: ORTHOPEDIC SURGERY | Facility: CLINIC | Age: 77
End: 2022-06-01
Payer: MEDICARE

## 2022-06-01 VITALS — HEIGHT: 69 IN | WEIGHT: 200 LBS | BODY MASS INDEX: 29.62 KG/M2

## 2022-06-01 DIAGNOSIS — M16.11 UNILATERAL PRIMARY OSTEOARTHRITIS, RIGHT HIP: ICD-10-CM

## 2022-06-01 PROCEDURE — 72170 X-RAY EXAM OF PELVIS: CPT | Mod: 59,LT

## 2022-06-01 PROCEDURE — 99213 OFFICE O/P EST LOW 20 MIN: CPT

## 2022-06-01 PROCEDURE — 73502 X-RAY EXAM HIP UNI 2-3 VIEWS: CPT

## 2022-06-01 NOTE — DISCUSSION/SUMMARY
[de-identified] : The patient presents s/p left thr and intermittent right hip pain. secondary to AVN. quality of life still acceptable and therefore not a candidate for right total hip replacement\par \par Plan:\par FU 1 year.

## 2022-06-01 NOTE — HISTORY OF PRESENT ILLNESS
[de-identified] : Follow-up left total hip replacement 2010 and arthritis right hip.  Patient reports left hip is pain and symptom free.  He is walking independently.  Patient does experience intermittent pain right GroinBut overall patient feels that quality of life is satisfactory

## 2022-06-01 NOTE — PHYSICAL EXAM
[de-identified] : Well-nourished, in no acute distress\par Alert and oriented to time, place and person\par Skin: no lesions discoloration\par Respirations: unlabored\par Cardiac: no leg swelling\par Lymphatic: no groin adenopathy\par right hip: flexion 105, internal 5, external 5, abduction 40 degrees, abduction 30 degrees pain free\par Left hip passive range of motion satisfactory pain-free [de-identified] :  x-rays AP Pelvis and AP/Lateral Xrays of the  hip taken in the office today demonstrates left hip satisfactory appearance right thr components and alignment. Right hip AVN right femoral head with subchondral collapse. and cysts

## 2022-06-16 ENCOUNTER — APPOINTMENT (OUTPATIENT)
Dept: HEPATOLOGY | Facility: CLINIC | Age: 77
End: 2022-06-16
Payer: MEDICARE

## 2022-06-16 PROCEDURE — 91200 LIVER ELASTOGRAPHY: CPT

## 2022-07-28 ENCOUNTER — OUTPATIENT (OUTPATIENT)
Dept: OUTPATIENT SERVICES | Facility: HOSPITAL | Age: 77
LOS: 1 days | End: 2022-07-28

## 2022-07-28 ENCOUNTER — APPOINTMENT (OUTPATIENT)
Dept: CV DIAGNOSTICS | Facility: HOSPITAL | Age: 77
End: 2022-07-28

## 2022-07-28 DIAGNOSIS — I49.3 VENTRICULAR PREMATURE DEPOLARIZATION: ICD-10-CM

## 2022-07-28 DIAGNOSIS — R93.1 ABNORMAL FINDINGS ON DIAGNOSTIC IMAGING OF HEART AND CORONARY CIRCULATION: ICD-10-CM

## 2022-07-28 DIAGNOSIS — Z86.79 PERSONAL HISTORY OF OTHER DISEASES OF THE CIRCULATORY SYSTEM: ICD-10-CM

## 2022-07-28 DIAGNOSIS — R06.02 SHORTNESS OF BREATH: ICD-10-CM

## 2022-07-28 PROCEDURE — 78452 HT MUSCLE IMAGE SPECT MULT: CPT | Mod: 26,MH

## 2022-07-28 PROCEDURE — 93018 CV STRESS TEST I&R ONLY: CPT | Mod: GC

## 2022-07-28 PROCEDURE — 93016 CV STRESS TEST SUPVJ ONLY: CPT | Mod: GC

## 2022-08-01 ENCOUNTER — LABORATORY RESULT (OUTPATIENT)
Age: 77
End: 2022-08-01

## 2022-08-01 ENCOUNTER — APPOINTMENT (OUTPATIENT)
Dept: HEPATOLOGY | Facility: CLINIC | Age: 77
End: 2022-08-01

## 2022-08-01 VITALS
TEMPERATURE: 98.3 F | DIASTOLIC BLOOD PRESSURE: 80 MMHG | RESPIRATION RATE: 16 BRPM | HEIGHT: 69 IN | HEART RATE: 62 BPM | BODY MASS INDEX: 29.92 KG/M2 | SYSTOLIC BLOOD PRESSURE: 138 MMHG | OXYGEN SATURATION: 100 % | WEIGHT: 202 LBS

## 2022-08-01 DIAGNOSIS — B18.2 CHRONIC VIRAL HEPATITIS C: ICD-10-CM

## 2022-08-01 PROCEDURE — 99215 OFFICE O/P EST HI 40 MIN: CPT

## 2022-08-01 RX ORDER — ROSUVASTATIN CALCIUM 5 MG/1
5 TABLET, FILM COATED ORAL DAILY
Qty: 30 | Refills: 0 | Status: DISCONTINUED | COMMUNITY
Start: 2020-09-10 | End: 2022-08-01

## 2022-08-02 LAB
AFP-TM SERPL-MCNC: 2.1 NG/ML
ALBUMIN SERPL ELPH-MCNC: 4 G/DL
ALP BLD-CCNC: 125 U/L
ALT SERPL-CCNC: 14 U/L
ANION GAP SERPL CALC-SCNC: 10 MMOL/L
AST SERPL-CCNC: 18 U/L
BASOPHILS # BLD AUTO: 0.03 K/UL
BASOPHILS NFR BLD AUTO: 0.6 %
BILIRUB SERPL-MCNC: 0.5 MG/DL
BUN SERPL-MCNC: 14 MG/DL
CALCIUM SERPL-MCNC: 9.7 MG/DL
CANCER AG125 SERPL-ACNC: 14 U/ML
CANCER AG19-9 SERPL-ACNC: <2 U/ML
CEA SERPL-MCNC: 6.5 NG/ML
CHLORIDE SERPL-SCNC: 109 MMOL/L
CO2 SERPL-SCNC: 23 MMOL/L
CREAT SERPL-MCNC: 1.47 MG/DL
EGFR: 48 ML/MIN/1.73M2
EOSINOPHIL # BLD AUTO: 0.08 K/UL
EOSINOPHIL NFR BLD AUTO: 1.6 %
HBV SURFACE AB SER QL: REACTIVE
HCT VFR BLD CALC: 40.1 %
HCV RNA SERPL NAA+PROBE-LOG IU: NOT DETECTED LOGIU/ML
HEPATITIS A IGG ANTIBODY: REACTIVE
HEPC RNA INTERP: NOT DETECTED
HGB BLD-MCNC: 13.3 G/DL
IMM GRANULOCYTES NFR BLD AUTO: 0.2 %
INR PPP: 1.08 RATIO
LYMPHOCYTES # BLD AUTO: 2.37 K/UL
LYMPHOCYTES NFR BLD AUTO: 46.9 %
MAN DIFF?: NORMAL
MCHC RBC-ENTMCNC: 33.2 GM/DL
MCHC RBC-ENTMCNC: 34 PG
MCV RBC AUTO: 102.6 FL
MONOCYTES # BLD AUTO: 0.5 K/UL
MONOCYTES NFR BLD AUTO: 9.9 %
NEUTROPHILS # BLD AUTO: 2.06 K/UL
NEUTROPHILS NFR BLD AUTO: 40.8 %
PLATELET # BLD AUTO: NORMAL K/UL
POTASSIUM SERPL-SCNC: 4.8 MMOL/L
PROT SERPL-MCNC: 6.7 G/DL
PT BLD: 12.6 SEC
RBC # BLD: 3.91 M/UL
RBC # FLD: 13.3 %
SODIUM SERPL-SCNC: 141 MMOL/L
WBC # FLD AUTO: 5.05 K/UL

## 2022-08-02 NOTE — ASSESSMENT
[FreeTextEntry1] : Mr. MYA LUKE is 77 year old male who is being seen for follow up visit with elevated ALP and Fatty Liver, with H/O HCV been treated and now SVR.\par \par # Elevated ALP - Labs on 08/01/22 is 125 < 04/08/22 shows  c/w (147 in 2019 and 141 in 2018) \par >> Elevated CEA – 6.5 on 08/01/22 labs. Due to Have CT with IVC done in sept 2022 with his Hem/Onc F/U. Advised to call back to discuss. Normal AFP and Ca 125, GI 19-9.\par >> Chronic high Cr 1.47 > 1.37 with low eGFR 53, noted. Fs/u with NEPH and Urology MDs.\par \par + Cholelithiasis in CT scan on 05/14/2021. Reviewed the gall stones from the imaging. Denies of any colicky abdominal pain related to food or any s/s of cholecystitis in the past or now. Educated on the signs of stone obstruction and s/s to self monitor and seek medical attention.  \par \par # Fatty Liver - Fibroscan June 24, 2021 F 0-1, S1. Liver biopsy in 2004 showed grade 1, stage I disease. \par MYA was educated about the fatty liver disease. Reviewed the spectrum of disease, the risk of disease progression to developing cirrhosis and the associated complications. Explained the patient may develop liver cancer without cirrhosis and therefore should be under the yearly surveillance with an abdominal ultrasound. Taught back that the best treatment of fatty liver disease is diet and exercise. Discussed the present diet with the patient and recommended the avoidance of fatty foods and to follow a heart healthy diet. Also explained that weight loss may lead to an improvement in the overall underlying liver disease. Taught back the physiology of alcohol abstinence has a important contribution to liver health. \par \par # Spleen –A relatively hypodense 4.4 cm splenic mass, unchanged dating back to the 12/10/2009 exam (CT scan on 05/14/2021)\par >> Thrombocytopenia - Chronic low PLT 94 noted in 04/08/21 labs with no splenomegaly. Most probably R/T cirrhosis. MYA will continue to self monitor for bleeding risks as discussed and Educated the need for treatment for any Invasive procedures planned with PLT <50. \par \par # Immunity – Immune to HAV/HBV (08/01/22) and COVID +spike > 250 (04/08/22) \par \par # Hepatic hemangioma - CT scan on 05/14/2021 shows a 1.5 cm enhancing mass in the left hepatic lobe (4:31), unchanged compared with the prior abdomen MRI where the finding was characterized as a hemangioma. There is a sub centimeter hypodense lesion that is too small to characterize in the left hepatic lobe, unchanged. A small calcified granuloma is seen in the right hepatic lobe. I reviewed the findings of a hemangioma.  I have explained the meaning of this to him.\par \par # Hepatitis C, genotype 1, who completed Harvoni on June 2, 2015 and is a sustained virological responder\par I explained the meaning of sustained virological response. I explained the hepatitis C antibody will be present for life. I explained that the patient will not be able to donate blood because of the positive antibody. I have explained that the antibody is not protective and that hepatitis C infection can be acquired again if exposed. I reviewed the risk factors for acquiring hepatitis C. \par \par PLAN to in 6  months with repeat labs, US ab and Fibroscan with RPA. \par Encouraged to call back in the interim with any issues or concerns so that we can address and assist as required.

## 2022-08-02 NOTE — PHYSICAL EXAM
[Cognitive Mini-Mental Status Normal?] : Cognitive Mini Mental Status Exam is normal [General Appearance - Alert] : alert [General Appearance - In No Acute Distress] : in no acute distress [Sclera] : the sclera and conjunctiva were normal [PERRL With Normal Accommodation] : pupils were equal in size, round, and reactive to light [Extraocular Movements] : extraocular movements were intact [Outer Ear] : the ears and nose were normal in appearance [Oropharynx] : the oropharynx was normal [Neck Appearance] : the appearance of the neck was normal [Neck Cervical Mass (___cm)] : no neck mass was observed [Jugular Venous Distention Increased] : there was no jugular-venous distention [Thyroid Diffuse Enlargement] : the thyroid was not enlarged [Thyroid Nodule] : there were no palpable thyroid nodules [Auscultation Breath Sounds / Voice Sounds] : lungs were clear to auscultation bilaterally [Heart Rate And Rhythm] : heart rate was normal and rhythm regular [Heart Sounds] : normal S1 and S2 [Heart Sounds Gallop] : no gallops [Murmurs] : no murmurs [Heart Sounds Pericardial Friction Rub] : no pericardial rub [Edema] : there was no peripheral edema [Bowel Sounds] : normal bowel sounds [Abdomen Soft] : soft [Abdomen Tenderness] : non-tender [Abdomen Mass (___ Cm)] : no abdominal mass palpated [Cervical Lymph Nodes Enlarged Posterior Bilaterally] : posterior cervical [Cervical Lymph Nodes Enlarged Anterior Bilaterally] : anterior cervical [Supraclavicular Lymph Nodes Enlarged Bilaterally] : supraclavicular [Axillary Lymph Nodes Enlarged Bilaterally] : axillary [Femoral Lymph Nodes Enlarged Bilaterally] : femoral [Inguinal Lymph Nodes Enlarged Bilaterally] : inguinal [No CVA Tenderness] : no ~M costovertebral angle tenderness [No Spinal Tenderness] : no spinal tenderness [Abnormal Walk] : normal gait [Nail Clubbing] : no clubbing  or cyanosis of the fingernails [Musculoskeletal - Swelling] : no joint swelling seen [Motor Tone] : muscle strength and tone were normal [Skin Color & Pigmentation] : normal skin color and pigmentation [Skin Turgor] : normal skin turgor [] : no rash [Deep Tendon Reflexes (DTR)] : deep tendon reflexes were 2+ and symmetric [Sensation] : the sensory exam was normal to light touch and pinprick [No Focal Deficits] : no focal deficits [Oriented To Time, Place, And Person] : oriented to person, place, and time [Impaired Insight] : insight and judgment were intact [Affect] : the affect was normal [Scleral Icterus] : No Scleral Icterus [Hepatojugular Reflux] : patient did not have a sustained hepatojugular reflux [Spider Angioma] : No spider angioma(s) were observed [Abdominal Bruit] : no abdominal bruit [Abdominal  Ascites] : no ascites [Splenomegaly] : no splenomegaly [Asterixis] : no asterixis observed [Jaundice] : No jaundice [Palmar Erythema] : no Palmar Erythema [Depression] : no depression [FreeTextEntry1] : well-healed surgical scar in the midline of the abdomen above the umbilicus

## 2022-08-08 LAB
ALP BLD-CCNC: 123 IU/L
ALP BONE CFR SERPL: 17 %
ALP INTEST CFR SERPL: 56 %
ALP LIVER CFR SERPL: 27 %

## 2022-08-29 ENCOUNTER — APPOINTMENT (OUTPATIENT)
Dept: MRI IMAGING | Facility: IMAGING CENTER | Age: 77
End: 2022-08-29

## 2022-08-29 ENCOUNTER — OUTPATIENT (OUTPATIENT)
Dept: OUTPATIENT SERVICES | Facility: HOSPITAL | Age: 77
LOS: 1 days | End: 2022-08-29
Payer: MEDICARE

## 2022-08-29 DIAGNOSIS — R97.0 ELEVATED CARCINOEMBRYONIC ANTIGEN [CEA]: ICD-10-CM

## 2022-08-29 PROCEDURE — 74183 MRI ABD W/O CNTR FLWD CNTR: CPT | Mod: 26,MH

## 2022-08-29 PROCEDURE — A9585: CPT

## 2022-08-29 PROCEDURE — 74183 MRI ABD W/O CNTR FLWD CNTR: CPT

## 2022-10-14 ENCOUNTER — OUTPATIENT (OUTPATIENT)
Dept: OUTPATIENT SERVICES | Facility: HOSPITAL | Age: 77
LOS: 1 days | Discharge: HOME | End: 2022-10-14

## 2022-10-14 DIAGNOSIS — D61.9 APLASTIC ANEMIA, UNSPECIFIED: ICD-10-CM

## 2022-10-18 ENCOUNTER — OUTPATIENT (OUTPATIENT)
Dept: OUTPATIENT SERVICES | Facility: HOSPITAL | Age: 77
LOS: 1 days | Discharge: HOME | End: 2022-10-18

## 2022-10-18 DIAGNOSIS — D61.9 APLASTIC ANEMIA, UNSPECIFIED: ICD-10-CM

## 2022-10-20 ENCOUNTER — APPOINTMENT (OUTPATIENT)
Dept: HEMATOLOGY ONCOLOGY | Facility: CLINIC | Age: 77
End: 2022-10-20

## 2022-10-20 VITALS
HEIGHT: 69.02 IN | BODY MASS INDEX: 29.03 KG/M2 | HEART RATE: 67 BPM | WEIGHT: 195.99 LBS | DIASTOLIC BLOOD PRESSURE: 82 MMHG | TEMPERATURE: 97.2 F | OXYGEN SATURATION: 99 % | SYSTOLIC BLOOD PRESSURE: 133 MMHG | RESPIRATION RATE: 16 BRPM

## 2022-10-20 PROCEDURE — 99214 OFFICE O/P EST MOD 30 MIN: CPT
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
## 2022-10-20 NOTE — REVIEW OF SYSTEMS
[Negative] : Allergic/Immunologic [FreeTextEntry4] : L ear tinnitus - chronic without change. MRIs negative 2015. [FreeTextEntry9] : mild RT hip pain.  Patient continues to see chiropractor. Exercises on his own. [de-identified] : tinnitus left ear.

## 2022-10-31 ENCOUNTER — APPOINTMENT (OUTPATIENT)
Dept: HEPATOLOGY | Facility: CLINIC | Age: 77
End: 2022-10-31

## 2022-10-31 VITALS
TEMPERATURE: 97.6 F | WEIGHT: 192 LBS | HEIGHT: 69 IN | OXYGEN SATURATION: 99 % | SYSTOLIC BLOOD PRESSURE: 114 MMHG | RESPIRATION RATE: 14 BRPM | HEART RATE: 74 BPM | BODY MASS INDEX: 28.44 KG/M2 | DIASTOLIC BLOOD PRESSURE: 75 MMHG

## 2022-10-31 DIAGNOSIS — R97.0 ELEVATED CARCINOEMBRYONIC ANTIGEN [CEA]: ICD-10-CM

## 2022-10-31 DIAGNOSIS — D18.03 HEMANGIOMA OF INTRA-ABDOMINAL STRUCTURES: ICD-10-CM

## 2022-10-31 PROCEDURE — 99215 OFFICE O/P EST HI 40 MIN: CPT

## 2022-10-31 RX ORDER — METOPROLOL SUCCINATE 25 MG/1
25 TABLET, EXTENDED RELEASE ORAL DAILY
Refills: 0 | Status: DISCONTINUED | COMMUNITY
Start: 2022-10-20 | End: 2022-10-31

## 2022-10-31 RX ORDER — AMIODARONE HYDROCHLORIDE 200 MG/1
200 TABLET ORAL
Qty: 90 | Refills: 0 | Status: COMPLETED | COMMUNITY
Start: 2022-07-18

## 2022-11-01 NOTE — PHYSICAL EXAM
[Cognitive Mini-Mental Status Normal?] : Cognitive Mini Mental Status Exam is normal [General Appearance - Alert] : alert [General Appearance - In No Acute Distress] : in no acute distress [Sclera] : the sclera and conjunctiva were normal [PERRL With Normal Accommodation] : pupils were equal in size, round, and reactive to light [Extraocular Movements] : extraocular movements were intact [Outer Ear] : the ears and nose were normal in appearance [Oropharynx] : the oropharynx was normal [Neck Appearance] : the appearance of the neck was normal [Neck Cervical Mass (___cm)] : no neck mass was observed [Jugular Venous Distention Increased] : there was no jugular-venous distention [Thyroid Diffuse Enlargement] : the thyroid was not enlarged [Thyroid Nodule] : there were no palpable thyroid nodules [Auscultation Breath Sounds / Voice Sounds] : lungs were clear to auscultation bilaterally [Heart Rate And Rhythm] : heart rate was normal and rhythm regular [Heart Sounds] : normal S1 and S2 [Heart Sounds Gallop] : no gallops [Murmurs] : no murmurs [Heart Sounds Pericardial Friction Rub] : no pericardial rub [Edema] : there was no peripheral edema [Bowel Sounds] : normal bowel sounds [Abdomen Soft] : soft [Abdomen Tenderness] : non-tender [Abdomen Mass (___ Cm)] : no abdominal mass palpated [Cervical Lymph Nodes Enlarged Posterior Bilaterally] : posterior cervical [Cervical Lymph Nodes Enlarged Anterior Bilaterally] : anterior cervical [Supraclavicular Lymph Nodes Enlarged Bilaterally] : supraclavicular [Axillary Lymph Nodes Enlarged Bilaterally] : axillary [Femoral Lymph Nodes Enlarged Bilaterally] : femoral [Inguinal Lymph Nodes Enlarged Bilaterally] : inguinal [No CVA Tenderness] : no ~M costovertebral angle tenderness [No Spinal Tenderness] : no spinal tenderness [Abnormal Walk] : normal gait [Nail Clubbing] : no clubbing  or cyanosis of the fingernails [Musculoskeletal - Swelling] : no joint swelling seen [Motor Tone] : muscle strength and tone were normal [Skin Color & Pigmentation] : normal skin color and pigmentation [Skin Turgor] : normal skin turgor [] : no rash [Deep Tendon Reflexes (DTR)] : deep tendon reflexes were 2+ and symmetric [Sensation] : the sensory exam was normal to light touch and pinprick [No Focal Deficits] : no focal deficits [Impaired Insight] : insight and judgment were intact [Oriented To Time, Place, And Person] : oriented to person, place, and time [Affect] : the affect was normal [Scleral Icterus] : No Scleral Icterus [Hepatojugular Reflux] : patient did not have a sustained hepatojugular reflux [Spider Angioma] : No spider angioma(s) were observed [Abdominal Bruit] : no abdominal bruit [Abdominal  Ascites] : no ascites [Splenomegaly] : no splenomegaly [Asterixis] : no asterixis observed [Jaundice] : No jaundice [Palmar Erythema] : no Palmar Erythema [Depression] : no depression [FreeTextEntry1] : well-healed surgical scar in the midline of the abdomen above the umbilicus

## 2022-11-01 NOTE — ASSESSMENT
[FreeTextEntry1] : Mr. MYA LUKE is 77 year old male who is being seen for follow up visit with elevated ALP and Fatty Liver, with H/O HCV been treated and now SVR.\par \par [facility] on 09[facility]: anti-HBs 97.3, anti-HBc-Reactive, HBs Ag-NR, Low , WDL- AST/ALT 19/15, ALP/Tbil 87/1.0, Hb/Hct 13.8/40.1, A1C 5.3%, Vit D 61.3, HIV Neg, and High Cr 1.3 with low eGFR 56. \par MRI on 08/29/2022: Stable 1.5 cm left hepatic hemangioma. No suspicious liver mass. GB: Small layering stones. A heterogeneous 4.2 cm splenic mass is unchanged since 2009, supporting a benign etiology. Colonic diverticulosis. Bilateral renal cysts.\par \par # Elevated ALP – Normalized in reviewed labs, was 125 < 04/08/22 shows 136 c/w (147 in 2019 and 141 in 2018) \par > Elevated CEA – 6.5 on 08/01/22 labs. Normal AFP and Ca 125, GI 19-9. No etiology seen in the imaging. \par > Chronic high Cr 1.47 > 1.37 with low eGFR 53, noted. Fs/u with NEPH and Urology MDs.\par # Renal stone - US ab on 07/29/2022: B/L renal cysts, sub CM non obstructing calculus in the mid portion of the LK. \par \par # Cholelithiasis - Reviewed the gall stones from the imaging. Denies of any colicky abdominal pain related to food or any s/s of cholecystitis in the past or now. Educated on the signs of stone obstruction and s/s to self-monitor and seek medical attention.  \par \par # Fatty Liver - Fibroscan June 24, 2021 F 0-1, S1. Liver biopsy in 2004 showed grade 1, stage I disease. \par MYA was educated about the fatty liver disease. Reviewed the spectrum of disease, the risk of disease progression to developing cirrhosis and the associated complications. Explained the patient may develop liver cancer without cirrhosis and therefore should be under the yearly surveillance with an abdominal ultrasound. Taught back that the best treatment of fatty liver disease is diet and exercise. Discussed the present diet with the patient and recommended the avoidance of fatty foods and to follow a heart healthy diet. Also explained that weight loss may lead to an improvement in the overall underlying liver disease. Taught back the physiology of alcohol abstinence has an important contribution to liver health. \par \par # Spleen –A benign splenic mass, unchanged dating back to the 12/10/2009 exam. No intervention or monitoring needed. \par > Thrombocytopenia - Chronic low PLT reviewed in the recent labs with no splenomegaly in imaging. Most probably R/T cirrhosis. MYA will continue to self-monitor for bleeding risks as discussed and Educated the need for treatment for any Invasive procedures planned with PLT <50. \par \par # Immunity – Immune to HAV/HBV (08/01/22) and COVID +spike > 250 (04/08/22) \par \par # Hepatic hemangioma - 1.5 cm enhancing mass in the left hepatic lobe unchanged compared with the prior abdomen MRI where the finding was characterized as a hemangioma. There is a sub centimeter hypodense lesion that is too small to characterize in the left hepatic lobe, unchanged. A small calcified granuloma is seen in the right hepatic lobe. I reviewed the findings of a hemangioma.  I have explained the meaning of this to him.\par \par # Hepatitis C, genotype 1, [facility] and is a sustained virological responder\par I explained the meaning of sustained virological response. I explained the hepatitis C antibody will be present for life. I explained that the patient will not be able to donate blood because of the positive antibody. I have explained that the antibody is not protective and that hepatitis C infection can be acquired again if exposed. I reviewed the risk factors for acquiring hepatitis C. \par \par # Colonic diverticulosis – Noted in imaging, F/U with COL with his GI MD.\par \par PLAN to in 6 months with repeat labs, US ab and Fibroscan with RPA. \par Encouraged to call back in the interim with any issues or concerns so that we can address and assist as required.\par

## 2022-11-01 NOTE — HISTORY OF PRESENT ILLNESS
[FreeTextEntry1] : Mr. MYA LUKE is 77 year old male who is being seen for follow up visit with elevated ALP and Fatty Liver, with H/O HCV been treated and now SVR.\par \par PH/o hepatitis C, genotype 1, was started Harvoni 3/9/2015 and completed June 2, 2015. Prior to that, he was treated with interferon and ribavirin developed aplastic anemia requiring hospitalization. \par \par Liver biopsy in 2004 showed grade 1, stage I disease. \par Fibroscan June 24, 2021 F 0-1, S1.\par Fibroscan on March 15, 2017 showed F1, S1 disease.\par Fibroscan on November 26, 2014 showed F1 disease.\par Immune - anti-HBs 97.3 (09/26/2022) COVID +spike > 250 - Labs on 04/08/22.\par \par [facility] 09[facility]20[facility]: anti-HBs 97.3, anti-HBc-Reactive, HBs Ag-NR, Low , WDL- AST/ALT 19/15, ALP/Tbil 87/1.0, Hb/Hct 13.8/40.1, A1C 5.3%, Vit D 61.3, HIV Neg, High Cr 1.3 with low eGFR 56.\par MRI on 08/29/2022: Stable 1.5 cm left hepatic hemangioma. No suspicious liver mass. GB: Small layering stones.A heterogeneous 4.2 cm splenic mass is unchanged since 2009, supporting a benign etiology. Colonic diverticulosis. Bilateral renal cysts.\par US ab on 07/29/2022: B/L renal cysts, sub CM non obstructing calculus in the mid portion of the LK. \par \par COVID +spike > 250 - Labs on 04/08/22 shows elevated isolated elevated  c/w (147 in 2019 and 141 in 2018) noted chronic high Cr 1.37 with low eGFR 53.\par \par Blood test June 7, 2021 ALT 15, AST 20, alkaline phosphatase 112, total bilirubin 0.9\par CT scan on 05/14/2021 shows a 1.5 cm enhancing mass in the left hepatic lobe (4:31), unchanged compared with the prior abdomen MRI where the finding was characterized as a hemangioma. There is a sub centimeter hypodense lesion that is too small to characterize in the left hepatic lobe, unchanged. A small calcified granuloma is seen in the right hepatic lobe. +Cholelithiasis. A relatively hypodense 4.4 cm splenic mass, unchanged dating back to the 12/10/2009 exam.\par \par Blood tests May 9 2019 creatinine 1.29, ALT 8, AST 14\par Blood tests February 10, 2017 ALT 12, AST 19\par Blood tests March 9, 2016 ALT 14, creatinine 1.37, HCV RNA not detected\par Blood tests September 1, 2015 ALT 13, HCV RNA not detected\par Blood tests June 12, 2015 ALT 13, AST 21\par *Blood tests May 14, 2015 HCV RNA not detected\par \par Blood tests May 7, 2015 with HCV RNA 14 ALT 14, creatinine 1.35, platelet count 128,000\par Blood tests April 2, 2015 ALT 12, hemoglobin 14.9, HCVRNA 41 international units\par Blood tests from January 28, 2015 show a platelet count of 137,000, creatinine 1.32, ALT 28\par Blood tests November 24, 2014 alkaline phosphatase 144, ALT 27, creatinine 1.32, platelet of 147,000. HCV RNA [id number] IU/ml\par Ab sonogram from April 15, 2014 shows no lesions within the liver. There is a splenic hemangioma which is known and gallstones. Blood tests from June 2, 2014 shows a platelet count of 135,000, hemoglobin 14.8, ALT 30, alkaline phosphatase 121, total Bili 0.8, creatinine 1.13, and alpha-fetoprotein 2.1. \par Blood test from November 8, 2013 shows a platelet count of 125,000, blood tests from November 29, 2013 show an ALT of 33, AST 32, alkaline phosphatase 165, creatinine 1.32\par

## 2022-11-04 ENCOUNTER — APPOINTMENT (OUTPATIENT)
Dept: CARDIOLOGY | Facility: CLINIC | Age: 77
End: 2022-11-04

## 2022-11-04 ENCOUNTER — APPOINTMENT (OUTPATIENT)
Dept: UROLOGY | Facility: CLINIC | Age: 77
End: 2022-11-04

## 2022-11-04 VITALS
SYSTOLIC BLOOD PRESSURE: 112 MMHG | HEART RATE: 60 BPM | BODY MASS INDEX: 28.44 KG/M2 | WEIGHT: 192 LBS | DIASTOLIC BLOOD PRESSURE: 68 MMHG | TEMPERATURE: 97.2 F | RESPIRATION RATE: 15 BRPM | HEIGHT: 69 IN | OXYGEN SATURATION: 100 %

## 2022-11-04 PROCEDURE — 93010 ELECTROCARDIOGRAM REPORT: CPT

## 2022-11-04 PROCEDURE — 99213 OFFICE O/P EST LOW 20 MIN: CPT

## 2022-11-04 PROCEDURE — 99205 OFFICE O/P NEW HI 60 MIN: CPT

## 2022-11-04 PROCEDURE — 93000 ELECTROCARDIOGRAM COMPLETE: CPT

## 2022-11-04 NOTE — HISTORY OF PRESENT ILLNESS
[FreeTextEntry1] : Patient seen for evaluation of urinary issues, specifically frequency. he notes progressively over past 2 years increased daytime frequency with urgency though leakage associated with nocturia 2-3 times. The FOS is decent without hesitancy, intermittency or straining. He has no h/o UTIs or retention.\par he has prior h/o microscopic hematuria with negative W/U including cystoscopy. \par No prior issues with elevated PSA. by TRUS prostate 41cc\par Trial Tamsulosin - didn’t see a benefit and if anything made the nocturia worse - 3-5 times. after a few months so stopped. symptoms about the same; Tried Rapaflo = same result\par  if smokes marijuana nocturia times 2. \par urgency the most bothersome. PVR 30cc. \par \par here after having gross hematuria earlier this week - 2 voids; no clots, flank or back pain, dysuria or change in baseline LUTs. still having episodes of gross hematuria 0 usually day after masturbation. \par still having LUTs as above \par \par 5/22 - saw OR for second opinion-\par in the interim stayed on Cialis 5mg which has helped the nocturia which was not helped by any of the 2 alpha blockers he took. Stopped finasteride due to the side effects. generally  only waking at 5am then when time to get up. Deals with the daytime urgency.\par No further hematuria as has stopped masturbation. \par \par 11/22 - remains on Tadalafil - helps his voiding. \par ULS - 7/22 - no hydronephrosis, small cysts and 6mm stone (known), \par noted pink urine off/om - \par MRI 7/22 - No renal mass or hydro

## 2022-11-05 LAB
APPEARANCE: ABNORMAL
BACTERIA: ABNORMAL
BILIRUBIN URINE: ABNORMAL
BLOOD URINE: ABNORMAL
COLOR: ABNORMAL
GLUCOSE QUALITATIVE U: NEGATIVE
HYALINE CASTS: 0 /LPF
KETONES URINE: NEGATIVE
LEUKOCYTE ESTERASE URINE: NEGATIVE
MICROSCOPIC-UA: NORMAL
NITRITE URINE: NEGATIVE
PH URINE: 8.5
PROTEIN URINE: ABNORMAL
RED BLOOD CELLS URINE: 8 /HPF
SPECIFIC GRAVITY URINE: 1.01
SQUAMOUS EPITHELIAL CELLS: 0 /HPF
TRIPLE PHOSPHATE CRYSTALS: ABNORMAL
UROBILINOGEN URINE: NORMAL
WHITE BLOOD CELLS URINE: 0 /HPF

## 2022-11-06 NOTE — REASON FOR VISIT
[FreeTextEntry3] : Dr. Arteaga [FreeTextEntry1] : MYA LUKE is a 77 year man with a history of HCV s/p ribavirin and ledipasvir/sofosbuvir, aplastic anemia, hypertension, hyperlipidemia who presents for a second opinion regarding his cardiovascular condition and medications.\par \par

## 2022-11-06 NOTE — REVIEW OF SYSTEMS
[Urinary Frequency] : urinary frequency [Hematuria] : hematuria [Erectile Dysfunction] : erectile dysfunction [Nocturia] : nocturia [Negative] : Psychiatric [de-identified] : see HPI

## 2022-11-06 NOTE — ASSESSMENT
[FreeTextEntry1] : 77 year old man with abnormal myocardial perfusion stress test suggestive of scar/ischemic cardiomyopathy.\par \par ECG shows sinus rhythm with ventricular ectopy with evidence of prior anterior and lateral infarction - consistent with cardiomyopathy. Known MV insufficiency, with systolic murmur on examination, but no symptoms.\par \par Blood pressure is normal today.\par \par Needs further evaluation for cardiomyopathy, arrhythmia, and mitral valve disorder. Given PVCs, arrhythmia and mitral valve prolapse history, a cardiac MRI to look for LGE and scar would be useful for risk stratification, if this can be performed with the prior hip replacement. (He had MRI of abdomen recently).\par \par Wiill hold metoprolol for now given side effects.\par Resume irbesartan for cardiomyopathy and hypertension\par Check echo and CMRI for cardiomyopathy and PVCs\par Further recommendations after above.\par \par We discussed the approach to arrhythmia management including anticoagulation and use of beta blocker if indicated. All questions answered to the best of my ability and to his apparent satisfaction.\par \par Follow up after the above\par \par

## 2022-11-06 NOTE — HISTORY OF PRESENT ILLNESS
[FreeTextEntry1] : This 77 year old man was followed by a cardiologist for hypertension and mitral valve prolapse and hypertension. Over the summer a monitor and echo showed some abnormality and he was sent for a nuclear stress test at [facility] which showed evidence of scar. He was prescribed amiodarone but this made him feel terrible so he stopped it. More recently was advised to take metoprolol, but feels this caused low blood pressure and fatigue. He had LUTS and was evaluated by Dr. Patel who started tadalafil which Mr. Wade feels helped considerably.\par \par He tells me he is active, exercises on a bicycle and with weights at home and does not note any limitations or problems when doing so. He may have had atrial fibrillation on a Holter monitor (he thinks associated with sexual arousal), but never on anticoagulation.\par \par Denies any history of CAD, prior revascularization/stenting. Was under the care of Dr. Shearer at [facility].\par \par He stopped metoprolol, but was urged by his PMD to resume it until speaking with me. Since going back on metoprolol he stopped irbesartan, because of concern for low BP, but denies any issues with irbesartan previously. \par \par [facility] 1.3 and normal electrolytes. Platelets were 103 and Hgb 13.8 - stable for him. Hgb A1c 5.3 %.\par \par He is s/p left total hip replacement in 2010 and excision of duodenal pre-cancerous neoplasm in 2000.\par \par \par Cardiovascular Summary:\par ----------------------------------------------\par ECG:\par 11/4/2022: NSR 60 bpm, occasional PVCs, low voltage, anterior and lateral infarct age undetermined\par ----------------------------------------------\par Stress:\par 7/28/2022: exercise MPI stress, 6 METS (6 minutes Quintin), moderate inferior, apical, lateral infarct no ischemia\par ----------------------------------------------\par \par \par

## 2022-11-06 NOTE — PHYSICAL EXAM
[Normal] : normal conjunctiva [Normal Venous Pressure] : normal venous pressure [No Carotid Bruit] : no carotid bruit [Normal S1, S2] : normal S1, S2 [Murmur] : murmur [Clear Lung Fields] : clear lung fields [Good Air Entry] : good air entry [No Respiratory Distress] : no respiratory distress  [Soft] : abdomen soft [Non Tender] : non-tender [Normal Gait] : normal gait [Gait - Sufficient for Exercise Testing] : gait - sufficient for exercise testing [No Edema] : no edema [No Cyanosis] : no cyanosis [No Clubbing] : no clubbing [No Varicosities] : no varicosities [No Rash] : no rash [Moves all extremities] : moves all extremities [No Focal Deficits] : no focal deficits [Alert and Oriented] : alert and oriented [de-identified] : systolic murmur at apex

## 2022-11-08 ENCOUNTER — NON-APPOINTMENT (OUTPATIENT)
Age: 77
End: 2022-11-08

## 2022-11-10 ENCOUNTER — OUTPATIENT (OUTPATIENT)
Dept: OUTPATIENT SERVICES | Facility: HOSPITAL | Age: 77
LOS: 1 days | End: 2022-11-10

## 2022-11-10 ENCOUNTER — APPOINTMENT (OUTPATIENT)
Dept: CV DIAGNOSITCS | Facility: HOSPITAL | Age: 77
End: 2022-11-10

## 2022-11-10 DIAGNOSIS — I42.9 CARDIOMYOPATHY, UNSPECIFIED: ICD-10-CM

## 2022-11-10 DIAGNOSIS — I34.1 NONRHEUMATIC MITRAL (VALVE) PROLAPSE: ICD-10-CM

## 2022-11-10 PROCEDURE — 93306 TTE W/DOPPLER COMPLETE: CPT | Mod: 26

## 2022-11-22 ENCOUNTER — APPOINTMENT (OUTPATIENT)
Dept: UROLOGY | Facility: CLINIC | Age: 77
End: 2022-11-22

## 2022-11-22 ENCOUNTER — OUTPATIENT (OUTPATIENT)
Dept: OUTPATIENT SERVICES | Facility: HOSPITAL | Age: 77
LOS: 1 days | End: 2022-11-22
Payer: MEDICARE

## 2022-11-22 DIAGNOSIS — R35.0 FREQUENCY OF MICTURITION: ICD-10-CM

## 2022-11-22 PROCEDURE — 52000 CYSTOURETHROSCOPY: CPT

## 2022-11-23 DIAGNOSIS — R31.0 GROSS HEMATURIA: ICD-10-CM

## 2022-12-01 ENCOUNTER — TRANSCRIPTION ENCOUNTER (OUTPATIENT)
Age: 77
End: 2022-12-01

## 2022-12-16 ENCOUNTER — NON-APPOINTMENT (OUTPATIENT)
Age: 77
End: 2022-12-16

## 2022-12-16 ENCOUNTER — RESULT REVIEW (OUTPATIENT)
Age: 77
End: 2022-12-16

## 2022-12-16 ENCOUNTER — APPOINTMENT (OUTPATIENT)
Dept: CARDIOLOGY | Facility: CLINIC | Age: 77
End: 2022-12-16

## 2022-12-16 VITALS
DIASTOLIC BLOOD PRESSURE: 78 MMHG | SYSTOLIC BLOOD PRESSURE: 130 MMHG | HEART RATE: 57 BPM | TEMPERATURE: 97.6 F | WEIGHT: 200.62 LBS | RESPIRATION RATE: 16 BRPM | HEIGHT: 69 IN | OXYGEN SATURATION: 97 % | BODY MASS INDEX: 29.71 KG/M2

## 2022-12-16 DIAGNOSIS — R49.0 DYSPHONIA: ICD-10-CM

## 2022-12-16 DIAGNOSIS — E78.00 PURE HYPERCHOLESTEROLEMIA, UNSPECIFIED: ICD-10-CM

## 2022-12-16 LAB
BASOPHILS # BLD AUTO: 0.01 K/UL — SIGNIFICANT CHANGE UP (ref 0–0.2)
BASOPHILS NFR BLD AUTO: 0.2 % — SIGNIFICANT CHANGE UP (ref 0–2)
CHOLEST SERPL-MCNC: 134 MG/DL
EOSINOPHIL # BLD AUTO: 0.04 K/UL — SIGNIFICANT CHANGE UP (ref 0–0.5)
EOSINOPHIL NFR BLD AUTO: 1 % — SIGNIFICANT CHANGE UP (ref 0–6)
HCT VFR BLD CALC: 41.4 % — SIGNIFICANT CHANGE UP (ref 39–50)
HDLC SERPL-MCNC: 46 MG/DL
HGB BLD-MCNC: 13.4 G/DL — SIGNIFICANT CHANGE UP (ref 13–17)
IMM GRANULOCYTES NFR BLD AUTO: 0.5 % — SIGNIFICANT CHANGE UP (ref 0–0.9)
LDLC SERPL CALC-MCNC: 76 MG/DL
LYMPHOCYTES # BLD AUTO: 1.56 K/UL — SIGNIFICANT CHANGE UP (ref 1–3.3)
LYMPHOCYTES # BLD AUTO: 37.5 % — SIGNIFICANT CHANGE UP (ref 13–44)
MCHC RBC-ENTMCNC: 32.4 G/DL — SIGNIFICANT CHANGE UP (ref 32–36)
MCHC RBC-ENTMCNC: 33.5 PG — SIGNIFICANT CHANGE UP (ref 27–34)
MCV RBC AUTO: 103.5 FL — HIGH (ref 80–100)
MONOCYTES # BLD AUTO: 0.39 K/UL — SIGNIFICANT CHANGE UP (ref 0–0.9)
MONOCYTES NFR BLD AUTO: 9.4 % — SIGNIFICANT CHANGE UP (ref 2–14)
NEUTROPHILS # BLD AUTO: 2.14 K/UL — SIGNIFICANT CHANGE UP (ref 1.8–7.4)
NEUTROPHILS NFR BLD AUTO: 51.4 % — SIGNIFICANT CHANGE UP (ref 43–77)
NONHDLC SERPL-MCNC: 88 MG/DL
NRBC # BLD: 0 /100 WBCS — SIGNIFICANT CHANGE UP (ref 0–0)
NT-PROBNP SERPL-MCNC: 1759 PG/ML
PLATELET # BLD AUTO: 92 K/UL — LOW (ref 150–400)
RBC # BLD: 4 M/UL — LOW (ref 4.2–5.8)
RBC # FLD: 12.9 % — SIGNIFICANT CHANGE UP (ref 10.3–14.5)
TRIGL SERPL-MCNC: 60 MG/DL
WBC # BLD: 4.16 K/UL — SIGNIFICANT CHANGE UP (ref 3.8–10.5)
WBC # FLD AUTO: 4.16 K/UL — SIGNIFICANT CHANGE UP (ref 3.8–10.5)

## 2022-12-16 PROCEDURE — 93010 ELECTROCARDIOGRAM REPORT: CPT

## 2022-12-16 PROCEDURE — 93000 ELECTROCARDIOGRAM COMPLETE: CPT

## 2022-12-16 PROCEDURE — 99215 OFFICE O/P EST HI 40 MIN: CPT | Mod: 25

## 2022-12-16 RX ORDER — METOPROLOL SUCCINATE 25 MG/1
25 TABLET, EXTENDED RELEASE ORAL
Refills: 0 | Status: COMPLETED | COMMUNITY
Start: 2022-11-04 | End: 2022-12-16

## 2022-12-16 NOTE — HISTORY OF PRESENT ILLNESS
[FreeTextEntry1] : Interval History:\par Since last visit, he reports feeling well. He denies any chest pain, palpitations.\par He has been taking one half tablet of metoprolol and irbesartan daily. He finds if he takes the whole tablet he feels unfocused. \par Did not schedule cardiac MRI.\par \par History:\par This 77 year old man was followed by a cardiologist for mitral valve prolapse and hypertension. Over the summer a monitor and echo showed some abnormality and he was sent for a nuclear stress test at [facility] which showed evidence of scar. He was prescribed amiodarone but this made him feel terrible so he stopped it. More recently was advised to take metoprolol, but feels this caused low blood pressure and fatigue. He had LUTS and was evaluated by Dr. Patel who started tadalafil which Mr. Wade feels helped considerably.\par \par He tells me he is active, exercises on a bicycle and with weights at home and does not note any limitations or problems when doing so. He may have had atrial fibrillation on a Holter monitor (he thinks associated with sexual arousal), but never on anticoagulation. He denies any history of syncope.\par \par Denies any history of CAD, prior revascularization/stenting. Was under the care of Dr. Shearer at [facility].\par \par He stopped metoprolol, but was urged by his PMD to resume it until speaking with me. Since going back on metoprolol he stopped irbesartan, because of concern for low BP, but denies any issues with irbesartan previously. \par \par [facility] Cr 1.3 and normal electrolytes. Platelets were 103 and Hgb 13.8 - stable for him. Hgb A1c 5.3 %.\par \par He is s/p left total hip replacement in 2010 and excision of duodenal pre-cancerous neoplasm in 2000.\par \par \par Cardiovascular Summary:\par ----------------------------------------------\par ECG:\par 12/16/2022: sinus with PVCs and PACs, lateral TWI, possible anterior infarct\par 11/4/2022: NSR 60 bpm, occasional PVCs, low voltage, anterior and lateral infarct age undetermined\par ----------------------------------------------\par Stress:\par 7/28/2022: exercise MPI stress, 6 METS (6 minutes Quintin), moderate inferior, apical, lateral infarct no ischemia\par ----------------------------------------------\par Echo:\par 11/10/2022: LVEF 43 %, moderate MR, segmental LV hypokinesis, moderate concentric LVH, moderate PHTN

## 2022-12-16 NOTE — REVIEW OF SYSTEMS
[Urinary Frequency] : urinary frequency [Hematuria] : hematuria [Erectile Dysfunction] : erectile dysfunction [Nocturia] : nocturia [Negative] : Psychiatric [de-identified] : see HPI

## 2022-12-16 NOTE — PHYSICAL EXAM
[Normal] : normal conjunctiva [Normal Venous Pressure] : normal venous pressure [No Carotid Bruit] : no carotid bruit [Normal S1, S2] : normal S1, S2 [Murmur] : murmur [Clear Lung Fields] : clear lung fields [Good Air Entry] : good air entry [No Respiratory Distress] : no respiratory distress  [Soft] : abdomen soft [Non Tender] : non-tender [Normal Gait] : normal gait [Gait - Sufficient for Exercise Testing] : gait - sufficient for exercise testing [No Edema] : no edema [No Cyanosis] : no cyanosis [No Clubbing] : no clubbing [No Varicosities] : no varicosities [No Rash] : no rash [Moves all extremities] : moves all extremities [No Focal Deficits] : no focal deficits [Alert and Oriented] : alert and oriented [de-identified] : systolic murmur at apex

## 2022-12-16 NOTE — REASON FOR VISIT
[FreeTextEntry3] : Dr. Arteaga [FreeTextEntry1] : MYA LUKE is a 77 year man with a history of HCV s/p ribavirin and ledipasvir/sofosbuvir, aplastic anemia, hypertension, hyperlipidemia who presents for follow up of ischemic cardiomyopathy, mitral regurgitation, and arrhythmia. \par \par

## 2022-12-16 NOTE — ASSESSMENT
[FreeTextEntry1] : 77 year old man with abnormal myocardial perfusion stress test suggestive of scar/ischemic cardiomyopathy. Unclear if he had ventricular tachycardia or other arrhythmia. ECG shows sinus rhythm with ventricular ectopy with evidence of prior anterior and lateral infarction - consistent with cardiomyopathy. Known MV insufficiency, with systolic murmur on examination, but not clear if he has symptoms from this. Given PVCs, arrhythmia and mitral valve prolapse history, a cardiac MRI to look for LGE and scar would be useful for risk stratification, if this can be performed with the prior hip replacement. (He had MRI of abdomen recently).\par \par - Will change to bisoprolol 10 mg daily (start at 5 mg daily first week)\par - continue irbesartan 150 mg daily\par - cardiac MRI with LGE to evaluate for scar\par - labs today with pro-BNP\par - Will arrange Holter monitor to evaluate for arrhythmia and quantify PVcs once on adequate dose of beta blocker\par - Repeat ETT/stress echo for MR once on appropriate medical therapy\par \par Follow up in 3 months with me.\par \par

## 2022-12-19 LAB
ALBUMIN SERPL ELPH-MCNC: 4.2 G/DL
ALP BLD-CCNC: 151 U/L
ALT SERPL-CCNC: 13 U/L
ANION GAP SERPL CALC-SCNC: 12 MMOL/L
AST SERPL-CCNC: 18 U/L
BILIRUB SERPL-MCNC: 0.5 MG/DL
BUN SERPL-MCNC: 14 MG/DL
CALCIUM SERPL-MCNC: 9.6 MG/DL
CHLORIDE SERPL-SCNC: 107 MMOL/L
CO2 SERPL-SCNC: 22 MMOL/L
CREAT SERPL-MCNC: 1.48 MG/DL
EGFR: 48 ML/MIN/1.73M2
GLUCOSE SERPL-MCNC: 99 MG/DL
POTASSIUM SERPL-SCNC: 5.3 MMOL/L
PROT SERPL-MCNC: 7.2 G/DL
SODIUM SERPL-SCNC: 141 MMOL/L

## 2022-12-30 ENCOUNTER — OUTPATIENT (OUTPATIENT)
Dept: OUTPATIENT SERVICES | Facility: HOSPITAL | Age: 77
LOS: 1 days | End: 2022-12-30
Payer: MEDICARE

## 2022-12-30 ENCOUNTER — APPOINTMENT (OUTPATIENT)
Dept: MRI IMAGING | Facility: CLINIC | Age: 77
End: 2022-12-30
Payer: MEDICARE

## 2022-12-30 DIAGNOSIS — I34.1 NONRHEUMATIC MITRAL (VALVE) PROLAPSE: ICD-10-CM

## 2022-12-30 DIAGNOSIS — I49.9 CARDIAC ARRHYTHMIA, UNSPECIFIED: ICD-10-CM

## 2022-12-30 DIAGNOSIS — I42.9 CARDIOMYOPATHY, UNSPECIFIED: ICD-10-CM

## 2022-12-30 PROCEDURE — 75561 CARDIAC MRI FOR MORPH W/DYE: CPT | Mod: 26,MH

## 2022-12-30 PROCEDURE — A9585: CPT

## 2022-12-30 PROCEDURE — 75565 CARD MRI VELOC FLOW MAPPING: CPT

## 2022-12-30 PROCEDURE — 75561 CARDIAC MRI FOR MORPH W/DYE: CPT

## 2023-02-07 ENCOUNTER — APPOINTMENT (OUTPATIENT)
Dept: UROLOGY | Facility: CLINIC | Age: 78
End: 2023-02-07

## 2023-03-07 ENCOUNTER — APPOINTMENT (OUTPATIENT)
Dept: CARDIOLOGY | Facility: CLINIC | Age: 78
End: 2023-03-07
Payer: MEDICARE

## 2023-03-07 ENCOUNTER — OUTPATIENT (OUTPATIENT)
Dept: OUTPATIENT SERVICES | Facility: HOSPITAL | Age: 78
LOS: 1 days | Discharge: HOME | End: 2023-03-07
Payer: MEDICARE

## 2023-03-07 VITALS
OXYGEN SATURATION: 97 % | BODY MASS INDEX: 29.06 KG/M2 | HEIGHT: 69 IN | RESPIRATION RATE: 16 BRPM | HEART RATE: 61 BPM | SYSTOLIC BLOOD PRESSURE: 147 MMHG | TEMPERATURE: 97.2 F | WEIGHT: 196.21 LBS | DIASTOLIC BLOOD PRESSURE: 96 MMHG

## 2023-03-07 DIAGNOSIS — D61.9 APLASTIC ANEMIA, UNSPECIFIED: ICD-10-CM

## 2023-03-07 PROCEDURE — 93246 EXT ECG>7D<15D RECORDING: CPT

## 2023-03-07 PROCEDURE — 93010 ELECTROCARDIOGRAM REPORT: CPT

## 2023-03-07 PROCEDURE — 99215 OFFICE O/P EST HI 40 MIN: CPT

## 2023-03-07 PROCEDURE — 93000 ELECTROCARDIOGRAM COMPLETE: CPT | Mod: 59

## 2023-03-07 RX ORDER — IRBESARTAN 150 MG/1
150 TABLET ORAL DAILY
Refills: 0 | Status: COMPLETED | COMMUNITY
Start: 2022-11-04 | End: 2023-03-07

## 2023-03-07 NOTE — ASSESSMENT
[FreeTextEntry1] : MRI findings and test results reviewed with the patient. Discussed need for medical therapy for ischemic cardiomyopathy to reduce morbidity and mortality and concerns for arrhythmia. Explained role of GDMT and rationale and need for dose titration to maximize efficacy.\par \par # Ischemic Cardiomyopathy with reduced EF: \par - Start Entresto 49/51 BID now, DC Irbesartan\par - continue bisoprolol 10 mg daily\par - repeat CMP and pro-BNP\par - add Aldactone next time as tolerated\par - start Jardiance 10 mg daily now, reviewed side effects with patient\par \par # PVCs, LGE in lateral wall on MRI and scar on myocardial perfusion stress imaging. \par - continue bisoprolol\par - 14 day rhythm monitor\par \par # Mitral regurgitation: \par - Repeat ETT/stress echo for MR once on appropriate medical therapy\par \par Close follow up in one month for med titration.\par

## 2023-03-07 NOTE — REVIEW OF SYSTEMS
[Urinary Frequency] : urinary frequency [Erectile Dysfunction] : erectile dysfunction [Nocturia] : nocturia [Negative] : Psychiatric [SOB] : no shortness of breath [Dyspnea on exertion] : not dyspnea during exertion [Chest Discomfort] : no chest discomfort [Lower Ext Edema] : no extremity edema [Palpitations] : no palpitations [Syncope] : no syncope [Hematuria] : no hematuria [de-identified] : see HPI

## 2023-03-07 NOTE — REASON FOR VISIT
[FreeTextEntry1] : MYA LUKE is a 77 year old man with a history of HCV s/p ribavirin and ledipasvir/sofosbuvir, aplastic anemia, hypertension, hyperlipidemia who presents for follow up of ischemic cardiomyopathy, mitral regurgitation, and arrhythmia. \par \par  [FreeTextEntry3] : Dr. Arteaga

## 2023-03-07 NOTE — PHYSICAL EXAM
[Normal] : normal conjunctiva [Normal Venous Pressure] : normal venous pressure [No Carotid Bruit] : no carotid bruit [Normal S1, S2] : normal S1, S2 [Murmur] : murmur [Clear Lung Fields] : clear lung fields [Good Air Entry] : good air entry [No Respiratory Distress] : no respiratory distress  [Soft] : abdomen soft [Non Tender] : non-tender [Normal Gait] : normal gait [Gait - Sufficient for Exercise Testing] : gait - sufficient for exercise testing [No Edema] : no edema [No Clubbing] : no clubbing [No Cyanosis] : no cyanosis [No Varicosities] : no varicosities [No Rash] : no rash [Moves all extremities] : moves all extremities [No Focal Deficits] : no focal deficits [Alert and Oriented] : alert and oriented [de-identified] : systolic murmur at apex

## 2023-03-30 ENCOUNTER — NON-APPOINTMENT (OUTPATIENT)
Age: 78
End: 2023-03-30

## 2023-03-30 DIAGNOSIS — R94.31 ABNORMAL ELECTROCARDIOGRAM [ECG] [EKG]: ICD-10-CM

## 2023-04-10 ENCOUNTER — APPOINTMENT (OUTPATIENT)
Dept: CARDIOLOGY | Facility: CLINIC | Age: 78
End: 2023-04-10
Payer: MEDICARE

## 2023-04-10 VITALS
WEIGHT: 174.16 LBS | SYSTOLIC BLOOD PRESSURE: 125 MMHG | RESPIRATION RATE: 16 BRPM | HEART RATE: 50 BPM | OXYGEN SATURATION: 96 % | DIASTOLIC BLOOD PRESSURE: 73 MMHG | BODY MASS INDEX: 25.8 KG/M2 | HEIGHT: 69 IN | TEMPERATURE: 97.2 F

## 2023-04-10 VITALS
OXYGEN SATURATION: 96 % | HEART RATE: 50 BPM | BODY MASS INDEX: 25.8 KG/M2 | SYSTOLIC BLOOD PRESSURE: 125 MMHG | HEIGHT: 69 IN | DIASTOLIC BLOOD PRESSURE: 73 MMHG | RESPIRATION RATE: 16 BRPM | WEIGHT: 174.17 LBS | TEMPERATURE: 97.2 F

## 2023-04-10 VITALS — BODY MASS INDEX: 28.65 KG/M2 | WEIGHT: 194.01 LBS

## 2023-04-10 VITALS — SYSTOLIC BLOOD PRESSURE: 138 MMHG | DIASTOLIC BLOOD PRESSURE: 78 MMHG

## 2023-04-10 DIAGNOSIS — Z86.79 PERSONAL HISTORY OF OTHER DISEASES OF THE CIRCULATORY SYSTEM: ICD-10-CM

## 2023-04-10 PROCEDURE — 99215 OFFICE O/P EST HI 40 MIN: CPT

## 2023-04-10 PROCEDURE — 93000 ELECTROCARDIOGRAM COMPLETE: CPT

## 2023-04-10 PROCEDURE — 93010 ELECTROCARDIOGRAM REPORT: CPT

## 2023-04-10 NOTE — REVIEW OF SYSTEMS
[Urinary Frequency] : urinary frequency [Erectile Dysfunction] : erectile dysfunction [Nocturia] : nocturia [Negative] : Psychiatric [SOB] : no shortness of breath [Dyspnea on exertion] : not dyspnea during exertion [Chest Discomfort] : no chest discomfort [Lower Ext Edema] : no extremity edema [Palpitations] : no palpitations [Syncope] : no syncope [Hematuria] : no hematuria [de-identified] : see HPI

## 2023-04-10 NOTE — PHYSICAL EXAM
[Normal] : normal conjunctiva [Normal Venous Pressure] : normal venous pressure [Normal S1, S2] : normal S1, S2 [Murmur] : murmur [Clear Lung Fields] : clear lung fields [Good Air Entry] : good air entry [No Respiratory Distress] : no respiratory distress  [Soft] : abdomen soft [Non Tender] : non-tender [Normal Gait] : normal gait [Gait - Sufficient for Exercise Testing] : gait - sufficient for exercise testing [No Edema] : no edema [No Cyanosis] : no cyanosis [No Clubbing] : no clubbing [No Varicosities] : no varicosities [No Rash] : no rash [Moves all extremities] : moves all extremities [No Focal Deficits] : no focal deficits [Alert and Oriented] : alert and oriented [No Skin Lesions] : no skin lesions [Normal Speech] : normal speech [de-identified] : systolic murmur at apex

## 2023-04-10 NOTE — HISTORY OF PRESENT ILLNESS
[FreeTextEntry1] : Interval History:\par Feels good. No chest pain, no palpitations.\par Began Jardiance and Entresto - tolerating well. Does note polyuria after starting Jardiance, which is tolerable.\par Zio monitor results reviewed, frequent VPCs, run of NSVT. No afib. No significant symptoms during monitoring.\par \par \par History:\par This 77 year old man was followed by a cardiologist for mitral valve prolapse and hypertension. In summer 2022 a monitor and echo showed some abnormality and he was sent for a nuclear stress test at [facility] which showed evidence of scar. He was prescribed amiodarone but this made him feel terrible so he stopped it. More recently advised to take metoprolol, but feels this caused low blood pressure and fatigue. He had LUTS and was evaluated by Dr. Patel who started tadalafil which Mr. Wade feels helped considerably.\par \par He tells me he is active, exercises on a bicycle and with weights at home and does not note limitations or problems when doing so. He may have had atrial fibrillation on a Holter monitor (he thinks associated with sexual arousal), but never on anticoagulation. He denies any history of syncope.\par \par Denies history of CAD, prior revascularization/stenting. Was under care of Dr. Shearer at [facility].\par \par He stopped metoprolol, but was urged by his PMD to resume it. Since going back on metoprolol he stopped irbesartan, because of concern for low BP, but denies any issues with irbesartan previously. \par \par [facility] 1.3 and normal electrolytes. Platelets were 103 and Hgb 13.8 - stable for him. Hgb A1c 5.3 %.\par \par He is s/p left total hip replacement in 2010 and excision of duodenal pre-cancerous neoplasm in 2000.\par \par December 16, 2022:\par Since last visit, he reports feeling well. He denies any chest pain, palpitations.\par He has been taking one half tablet of metoprolol and irbesartan daily. He finds if he takes the whole tablet he feels unfocused. \par Did not schedule cardiac MRI.\par \par Marc 7, 2023:\par Cardiac MRI reviewed with Cardiac Imaging at [facility] and is most consistent with ischemic cardiomyopathy. He denies any new symptoms, but feels stressed by  and caring for his wife who had a stroke. He denies chest pain. He denies exertional dyspnea. Rare palpitations (which he associates with stress). Adherent to irbesartan and bisoprolol.\par \par \par Cardiovascular Summary:\par ----------------------------------------------\par ECG:\par 4/10/2023: sinus amy 54 bpm, lateral TWI, anterior infarct, age undetermined, PVCs\par 3/7/2023: sinus bradycardia 59 bpm, frequent PVCs, lateral TWI, possible anterior infarct\par 12/16/2022: sinus with PVCs and PACs, lateral TWI, possible anterior infarct\par 11/4/2022: NSR 60 bpm, occasional PVCs, low voltage, anterior and lateral infarct age undetermined\par ----------------------------------------------\par Stress:\par 7/28/2022: exercise MPI stress, 6 METS (6 minutes Quintin), moderate inferior, apical, lateral infarct no ischemia\par ----------------------------------------------\par Echo:\par 11/10/2022: LVEF 43 %, moderate MR, segmental LV hypokinesis, moderate concentric LVH, moderate PHTN\par ----------------------------------------------\par MRI\par 12/30/2022: LGE in lateral wall, in the septum, and inferior wall, and LGE within apex, LVEF 34 %\par ----------------------------------------------\par Remote/ambulatory rhythm monitoring:\par 3/2023: Jeffery 14 days: No Afib. NSVT 11.8 seconds, frequent PVCs (8.2%), bigeminy, trigeminy

## 2023-04-10 NOTE — ASSESSMENT
[FreeTextEntry1] : MRI findings and test results reviewed with the patient. Discussed need for medical therapy for ischemic cardiomyopathy to reduce morbidity and mortality and concerns for arrhythmia. Explained role of GDMT and rationale and need for dose titration to maximize efficacy.\par \par # Ischemic Cardiomyopathy with reduced EF: pro-BNP 1759 pg/mL 12/2022\par - Increase Entresto to 49/51 BID now\par - continue bisoprolol 10 mg daily\par - repeat CMP and pro-BNP\par - no Aldactone yet given K and Cr, if stable to add next time\par - Jardiance 10 mg daily,\par - 12/16/2022: lipid panel LDL 76, HDL 46, total 134\par - continue rosuvastatin 10 mg daily\par \par # PVCs, LGE in lateral wall on MRI and scar on myocardial perfusion stress imaging. \par - continue bisoprolol\par - 14 day rhythm monitor as above\par - discussed role for AICD and ablation, will discuss pending optimization of medical therapy\par \par # Mitral regurgitation: \par - Re-eval once on optimal medical therapy\par \par Follow up in 2 months with me\par

## 2023-04-10 NOTE — REASON FOR VISIT
[FreeTextEntry3] : Dr. Arteaga [FreeTextEntry1] : MYA LUKE is a 77 year old man with a history of HCV s/p ribavirin and ledipasvir/sofosbuvir, aplastic anemia, hypertension, hyperlipidemia who presents for follow up of ischemic cardiomyopathy, mitral regurgitation, and arrhythmia. \par \par  supplemental O2

## 2023-05-03 ENCOUNTER — NON-APPOINTMENT (OUTPATIENT)
Age: 78
End: 2023-05-03

## 2023-05-30 ENCOUNTER — APPOINTMENT (OUTPATIENT)
Dept: UROLOGY | Facility: CLINIC | Age: 78
End: 2023-05-30

## 2023-06-05 ENCOUNTER — APPOINTMENT (OUTPATIENT)
Dept: HEMATOLOGY ONCOLOGY | Facility: CLINIC | Age: 78
End: 2023-06-05

## 2023-06-05 ENCOUNTER — APPOINTMENT (OUTPATIENT)
Dept: CARDIOLOGY | Facility: CLINIC | Age: 78
End: 2023-06-05
Payer: MEDICARE

## 2023-06-05 ENCOUNTER — OUTPATIENT (OUTPATIENT)
Dept: OUTPATIENT SERVICES | Facility: HOSPITAL | Age: 78
LOS: 1 days | Discharge: HOME | End: 2023-06-05
Payer: MEDICARE

## 2023-06-05 VITALS
TEMPERATURE: 96.8 F | WEIGHT: 187.39 LBS | BODY MASS INDEX: 27.76 KG/M2 | HEIGHT: 69 IN | SYSTOLIC BLOOD PRESSURE: 106 MMHG | OXYGEN SATURATION: 99 % | DIASTOLIC BLOOD PRESSURE: 71 MMHG | RESPIRATION RATE: 16 BRPM | HEART RATE: 57 BPM

## 2023-06-05 DIAGNOSIS — I34.1 NONRHEUMATIC MITRAL (VALVE) PROLAPSE: ICD-10-CM

## 2023-06-05 DIAGNOSIS — D64.9 ANEMIA, UNSPECIFIED: ICD-10-CM

## 2023-06-05 PROCEDURE — 93000 ELECTROCARDIOGRAM COMPLETE: CPT

## 2023-06-05 PROCEDURE — 93010 ELECTROCARDIOGRAM REPORT: CPT

## 2023-06-05 PROCEDURE — 99215 OFFICE O/P EST HI 40 MIN: CPT

## 2023-06-05 NOTE — PHYSICAL EXAM
[Normal] : normal conjunctiva [Normal Venous Pressure] : normal venous pressure [Normal S1, S2] : normal S1, S2 [Murmur] : murmur [Clear Lung Fields] : clear lung fields [Good Air Entry] : good air entry [No Respiratory Distress] : no respiratory distress  [Soft] : abdomen soft [Non Tender] : non-tender [Normal Gait] : normal gait [Gait - Sufficient for Exercise Testing] : gait - sufficient for exercise testing [No Edema] : no edema [No Cyanosis] : no cyanosis [No Clubbing] : no clubbing [No Varicosities] : no varicosities [No Rash] : no rash [No Skin Lesions] : no skin lesions [Moves all extremities] : moves all extremities [No Focal Deficits] : no focal deficits [Normal Speech] : normal speech [Alert and Oriented] : alert and oriented [de-identified] : systolic murmur at apex

## 2023-06-05 NOTE — ASSESSMENT
[FreeTextEntry1] : MRI findings and test results reviewed with the patient. Discussed need for medical therapy for ischemic cardiomyopathy to reduce morbidity and mortality and concerns for arrhythmia. Explained role of GDMT and rationale and need for dose titration to maximize efficacy.\par \par # Ischemic Cardiomyopathy with reduced EF: pro-BNP 1759 pg/mL 12/2022\par - continue Entresto to 24/26 BID (Did not tolerate increased dose)\par - continue bisoprolol at reduced dose (5 mg daily)\par - repeat CMP and pro-BNP today\par - no Aldactone yet given K and increased Cr\par - Jardiance 10 mg daily to continue\par - 12/16/2022: lipid panel LDL 76, HDL 46, total 134\par - continue rosuvastatin 10 mg daily\par - repeat echo with GLS to follow up LV function\par - if foggy and BP < 100, can hold or take half tablet of Entresto - do not hold for asymptomatic hypotension\par \par # PVCs, LGE in lateral wall on MRI and scar on myocardial perfusion stress imaging. \par - continue bisoprolol as tolerated\par - discussed role for AICD and ablation, will discuss pending optimization of medical therapy\par \par \par # Mitral regurgitation, moderate\par - Re-eval once on optimal medical therapy\par - repeat echo\par \par Follow up in 3 months with me\par \par \par Above discussed with Mr. Myers and all questions answered to the best of my ability and to his apparent satisfaction.

## 2023-06-05 NOTE — REVIEW OF SYSTEMS
[Urinary Frequency] : urinary frequency [Erectile Dysfunction] : erectile dysfunction [Nocturia] : nocturia [Negative] : Psychiatric [SOB] : no shortness of breath [Dyspnea on exertion] : not dyspnea during exertion [Chest Discomfort] : no chest discomfort [Lower Ext Edema] : no extremity edema [Palpitations] : no palpitations [Syncope] : no syncope [Hematuria] : no hematuria [de-identified] : see HPI

## 2023-06-05 NOTE — REASON FOR VISIT
[FreeTextEntry3] : Dr. Arteaga [FreeTextEntry1] : MYA LUKE is a 77 year old man with a history of HCV s/p ribavirin and ledipasvir/sofosbuvir, aplastic anemia, hypertension, hyperlipidemia who presents for follow up of ischemic cardiomyopathy, mitral regurgitation, and arrhythmia. \par \par

## 2023-06-05 NOTE — HISTORY OF PRESENT ILLNESS
[FreeTextEntry1] : Interval History:\par He continued to feel foggy so PMD advised him to decrease dose of bisoprolol to 5 and to take only 1/2 tablet of Entresto twice daily, as BP was 90s-100s. 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 In summer 2022 a monitor and echo showed some abnormality and he was sent for a nuclear stress test at [facility] which showed evidence of scar. He was prescribed amiodarone but this made him feel terrible so he stopped it. More recently advised to take metoprolol, but feels this caused low blood pressure and fatigue. He had LUTS and was evaluated by Dr. Patel who started tadalafil which Mr. Wade feels helped considerably.\par \par He tells me he is active, exercises on a bicycle and with weights at home and does not note limitations or problems when doing so. He may have had atrial fibrillation on a Holter monitor (he thinks associated with sexual arousal), but never on anticoagulation. He denies any history of syncope.\par \par Denies history of CAD, prior revascularization/stenting. Was under care of Dr. Shearer at [facility].\par \par He stopped metoprolol, but was urged by his PMD to resume it. Since going back on metoprolol he stopped irbesartan, because of concern for low BP, but denies any issues with irbesartan previously. \par \par [facility] showed Cr 1.3 and normal electrolytes. Platelets were 103 and Hgb 13.8 - stable for him. Hgb A1c 5.3 %.\par \par He is s/p left total hip replacement in 2010 and excision of duodenal pre-cancerous neoplasm in 2000.\par \par December 16, 2022:\par Since last visit, he reports feeling well. He denies any chest pain, palpitations.\par He has been taking one half tablet of metoprolol and irbesartan daily. He finds if he takes the whole tablet he feels unfocused. \par Did not schedule cardiac MRI.\par \par March 7, 2023:\par Cardiac MRI reviewed with Cardiac Imaging at [facility] and is most consistent with ischemic cardiomyopathy. He denies any new symptoms, but feels stressed by  and caring for his wife who had a stroke. He denies chest pain. He denies exertional dyspnea. Rare palpitations (which he associates with stress). Adherent to irbesartan and bisoprolol.\par \par Apr 10, 2023:\par Feels good. No chest pain, no palpitations.\par Began Jardiance and Entresto - tolerating well. Does note polyuria after starting Jardiance, which is tolerable.\par Zio monitor results reviewed, frequent VPCs, run of NSVT. No afib. No significant symptoms during monitoring.\par \par \par Cardiovascular Summary:\par ----------------------------------------------\par ECG:\par 6/5/2023: sinus 57 bpm w sinus arrhythmia, low voltage lateral TWI\par 4/10/2023: sinus amy 54 bpm, lateral TWI, anterior infarct, age undetermined, PVCs\par 3/7/2023: sinus bradycardia 59 bpm, frequent PVCs, lateral TWI, possible anterior infarct\par 12/16/2022: sinus with PVCs and PACs, lateral TWI, possible anterior infarct\par 11/4/2022: NSR 60 bpm, occasional PVCs, low voltage, anterior and lateral infarct age undetermined\par ----------------------------------------------\par Stress:\par 7/28/2022: exercise MPI stress, 6 METS (6 minutes Quintin), moderate inferior, apical, lateral infarct no ischemia\par ----------------------------------------------\par Echo:\par 11/10/2022: LVEF 43 %, moderate MR, segmental LV hypokinesis, moderate concentric LVH, moderate PHTN\par ----------------------------------------------\par MRI\par 12/30/2022: LGE in lateral wall, in the septum, and inferior wall, and LGE within apex, LVEF 34 %\par ----------------------------------------------\par Remote/ambulatory rhythm monitoring:\par 3/2023: Zio 14 days: No Afib. NSVT 11.8 seconds, frequent PVCs (8.2%), bigeminy, trigeminy

## 2023-06-07 LAB
ALBUMIN SERPL ELPH-MCNC: 4.2 G/DL
ALP BLD-CCNC: 91 U/L
ALT SERPL-CCNC: 9 U/L
ANION GAP SERPL CALC-SCNC: 13 MMOL/L
AST SERPL-CCNC: 15 U/L
BILIRUB SERPL-MCNC: 0.7 MG/DL
BUN SERPL-MCNC: 16 MG/DL
CALCIUM SERPL-MCNC: 9.5 MG/DL
CHLORIDE SERPL-SCNC: 108 MMOL/L
CO2 SERPL-SCNC: 21 MMOL/L
CREAT SERPL-MCNC: 1.46 MG/DL
EGFR: 49 ML/MIN/1.73M2
GLUCOSE SERPL-MCNC: 85 MG/DL
MAGNESIUM SERPL-MCNC: 2.1 MG/DL
NT-PROBNP SERPL-MCNC: 1139 PG/ML
POTASSIUM SERPL-SCNC: 4.9 MMOL/L
PROT SERPL-MCNC: 6.5 G/DL
SODIUM SERPL-SCNC: 142 MMOL/L

## 2023-06-16 ENCOUNTER — APPOINTMENT (OUTPATIENT)
Dept: UROLOGY | Facility: CLINIC | Age: 78
End: 2023-06-16

## 2023-06-28 ENCOUNTER — APPOINTMENT (OUTPATIENT)
Dept: ORTHOPEDIC SURGERY | Facility: CLINIC | Age: 78
End: 2023-06-28
Payer: MEDICARE

## 2023-06-28 DIAGNOSIS — M87.051 IDIOPATHIC ASEPTIC NECROSIS OF RIGHT FEMUR: ICD-10-CM

## 2023-06-28 DIAGNOSIS — Z96.642 PRESENCE OF LEFT ARTIFICIAL HIP JOINT: ICD-10-CM

## 2023-06-28 PROCEDURE — 73502 X-RAY EXAM HIP UNI 2-3 VIEWS: CPT

## 2023-06-28 PROCEDURE — 99213 OFFICE O/P EST LOW 20 MIN: CPT

## 2023-06-28 NOTE — HISTORY OF PRESENT ILLNESS
[de-identified] : MYA LUKE is a 77 year old male who presents for follow up evaluation of left total hip replacement 2010 and arthritis right hip Reports significant improvement of pre-operative pain. Right hip avascular necrosis patent uses self administered chiropractic adjustment with decreased in occasional pain right hip and low back .Denies any left hip symptoms.  Patient is ambulating independently.

## 2023-06-28 NOTE — ADDENDUM
[FreeTextEntry1] : I, Ophelia Gregorio, acted solely as a scribe for Dr. Fabian Gamble MD on this date 06/28/2023 .\par \par All medical record entries made by the Scribe were at my, Dr. Fabian Gamble MD , direction and personally dictated by me on 06/28/2023 . I have reviewed the chart and agree that the record accurately reflects my personal performance of the history, physical exam, assessment and plan. I have also personally directed, reviewed, and agreed with the chart.\par

## 2023-06-28 NOTE — PHYSICAL EXAM
[de-identified] : Constitutional: Well nourished , well developed and in no acute distress\par Psychiatric: Alert and oriented to time place and person.Appropriate affect .\par Skin: Head, neck, arms and lower extremities:no lesions or discoloration\par HEENT: Normocephalic, EOM intact, Nasal septum midline,\par Respiratory: Unlabored respirations,no audible wheezing ,no tachypnea, no cyanosis\par Cardiovascular: No leg swelling no ankle edema no JVD, pulse regular\par Vascular: No calf or thigh tenderness,\par Peripheral pulses: intact\par Lymphatics: No groin adenopathy,no lymphedema lower or upper extremities\par \par Left Hip Exam:\par Inspection/Palpation : no tenderness, no swelling, no deformity, Incision healed\par Leg Lengths: equal\par Passive Range of Motion: flexion   degrees, internal    degrees, external    degrees, abduction    degrees, adduction    degrees\par Strength: resisted hip flexion 5/5, resisted hip abduction 5/5\par Motor Strength: Peroneals, EHL, and tibialis anterior 5/5\par Reflexes: Patella 2+ R=L, Achilles 2+ R=L\par Skin : no erythema, no ecchymosis\par Sensation : sensation to light touch intact\par Vascular Exam : no edema, no cyanosis, dorsalis pedis artery pulse 2+, posterior tibial artery pulse 2+\par  [de-identified] : Prior x-rays AP Pelvis and AP/Lateral Xrays of the  hip taken in the office today demonstrates left hip satisfactory appearance right thr components and alignment. Right hip AVN right femoral head with subchondral collapse. and cysts

## 2023-06-28 NOTE — DISCUSSION/SUMMARY
[de-identified] : MYA LUKE is a 77 year old male who presents for follow up evaluation of left total hip replacement 2010. Overall, patient is doing well. Left hip is symptom and pain-free. Right hip avascular Kalosis is minimally symptomatic\par \par Follow up in 1 year. \par \par The patient understood and verbally agreed to the treatment plan. All of their questions were answered. The patient should call the office if they have any questions or experience worsening symptoms.\par

## 2023-07-06 ENCOUNTER — RESULT REVIEW (OUTPATIENT)
Age: 78
End: 2023-07-06

## 2023-07-06 ENCOUNTER — APPOINTMENT (OUTPATIENT)
Dept: HEMATOLOGY ONCOLOGY | Facility: CLINIC | Age: 78
End: 2023-07-06
Payer: MEDICARE

## 2023-07-06 VITALS
WEIGHT: 185.19 LBS | RESPIRATION RATE: 17 BRPM | DIASTOLIC BLOOD PRESSURE: 61 MMHG | TEMPERATURE: 96.5 F | SYSTOLIC BLOOD PRESSURE: 92 MMHG | HEART RATE: 66 BPM | OXYGEN SATURATION: 99 % | BODY MASS INDEX: 27.35 KG/M2

## 2023-07-06 LAB
ALBUMIN SERPL ELPH-MCNC: 4.4 G/DL
ALP BLD-CCNC: 110 U/L
ALT SERPL-CCNC: 13 U/L
ANION GAP SERPL CALC-SCNC: 11 MMOL/L
AST SERPL-CCNC: 22 U/L
BASOPHILS # BLD AUTO: 0.02 K/UL — SIGNIFICANT CHANGE UP (ref 0–0.2)
BASOPHILS NFR BLD AUTO: 0.4 % — SIGNIFICANT CHANGE UP (ref 0–2)
BILIRUB SERPL-MCNC: 0.5 MG/DL
BUN SERPL-MCNC: 21 MG/DL
CALCIUM SERPL-MCNC: 9.8 MG/DL
CHLORIDE SERPL-SCNC: 107 MMOL/L
CO2 SERPL-SCNC: 24 MMOL/L
CREAT SERPL-MCNC: 1.91 MG/DL
EGFR: 35 ML/MIN/1.73M2
EOSINOPHIL # BLD AUTO: 0.05 K/UL — SIGNIFICANT CHANGE UP (ref 0–0.5)
EOSINOPHIL NFR BLD AUTO: 1 % — SIGNIFICANT CHANGE UP (ref 0–6)
GLUCOSE SERPL-MCNC: 109 MG/DL
HCT VFR BLD CALC: 44.1 % — SIGNIFICANT CHANGE UP (ref 39–50)
HGB BLD-MCNC: 14.4 G/DL — SIGNIFICANT CHANGE UP (ref 13–17)
IMM GRANULOCYTES NFR BLD AUTO: 0.2 % — SIGNIFICANT CHANGE UP (ref 0–0.9)
LDH SERPL-CCNC: 182 U/L
LYMPHOCYTES # BLD AUTO: 2.06 K/UL — SIGNIFICANT CHANGE UP (ref 1–3.3)
LYMPHOCYTES # BLD AUTO: 40.9 % — SIGNIFICANT CHANGE UP (ref 13–44)
MCHC RBC-ENTMCNC: 32.7 G/DL — SIGNIFICANT CHANGE UP (ref 32–36)
MCHC RBC-ENTMCNC: 34.1 PG — HIGH (ref 27–34)
MCV RBC AUTO: 104.5 FL — HIGH (ref 80–100)
MONOCYTES # BLD AUTO: 0.53 K/UL — SIGNIFICANT CHANGE UP (ref 0–0.9)
MONOCYTES NFR BLD AUTO: 10.5 % — SIGNIFICANT CHANGE UP (ref 2–14)
NEUTROPHILS # BLD AUTO: 2.37 K/UL — SIGNIFICANT CHANGE UP (ref 1.8–7.4)
NEUTROPHILS NFR BLD AUTO: 47 % — SIGNIFICANT CHANGE UP (ref 43–77)
NRBC # BLD: 0 /100 WBCS — SIGNIFICANT CHANGE UP (ref 0–0)
PLATELET # BLD AUTO: 86 K/UL — LOW (ref 150–400)
POTASSIUM SERPL-SCNC: 5.6 MMOL/L
PROT SERPL-MCNC: 6.9 G/DL
RBC # BLD: 4.22 M/UL — SIGNIFICANT CHANGE UP (ref 4.2–5.8)
RBC # FLD: 13 % — SIGNIFICANT CHANGE UP (ref 10.3–14.5)
SODIUM SERPL-SCNC: 142 MMOL/L
WBC # BLD: 5.04 K/UL — SIGNIFICANT CHANGE UP (ref 3.8–10.5)
WBC # FLD AUTO: 5.04 K/UL — SIGNIFICANT CHANGE UP (ref 3.8–10.5)

## 2023-07-06 PROCEDURE — 99214 OFFICE O/P EST MOD 30 MIN: CPT
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
## 2023-07-06 NOTE — REVIEW OF SYSTEMS
[Negative] : Allergic/Immunologic [FreeTextEntry4] : L ear tinnitus - chronic without change. MRIs negative 2015. [FreeTextEntry9] : mild RT hip pain.  Patient continues to see chiropractor. Exercises on his own. [de-identified] : tinnitus left ear.

## 2023-07-10 LAB
ALBUMIN MFR SERPL ELPH: 57.6 %
ALBUMIN SERPL-MCNC: 4 G/DL
ALBUMIN/GLOB SERPL: 1.4 RATIO
ALPHA1 GLOB MFR SERPL ELPH: 3.9 %
ALPHA1 GLOB SERPL ELPH-MCNC: 0.3 G/DL
ALPHA2 GLOB MFR SERPL ELPH: 11.3 %
ALPHA2 GLOB SERPL ELPH-MCNC: 0.8 G/DL
B-GLOBULIN MFR SERPL ELPH: 10 %
B-GLOBULIN SERPL ELPH-MCNC: 0.7 G/DL
DEPRECATED KAPPA LC FREE/LAMBDA SER: 1.56 RATIO
ESTIMATED AVERAGE GLUCOSE: 114 MG/DL
FERRITIN SERPL-MCNC: 428 NG/ML
GAMMA GLOB FLD ELPH-MCNC: 1.2 G/DL
GAMMA GLOB MFR SERPL ELPH: 17.2 %
HBA1C MFR BLD HPLC: 5.6 %
IGA SER QL IEP: 211 MG/DL
IGG SER QL IEP: 1108 MG/DL
IGM SER QL IEP: 214 MG/DL
INTERPRETATION SERPL IEP-IMP: NORMAL
KAPPA LC CSF-MCNC: 1.8 MG/DL
KAPPA LC SERPL-MCNC: 2.81 MG/DL
M PROTEIN SPEC IFE-MCNC: NORMAL
NT-PROBNP SERPL-MCNC: 654 PG/ML
PROT SERPL-MCNC: 6.9 G/DL
PROT SERPL-MCNC: 6.9 G/DL
PSA SERPL-MCNC: 3.21 NG/ML

## 2023-07-13 ENCOUNTER — APPOINTMENT (OUTPATIENT)
Dept: CV DIAGNOSITCS | Facility: HOSPITAL | Age: 78
End: 2023-07-13
Payer: MEDICARE

## 2023-07-13 ENCOUNTER — OUTPATIENT (OUTPATIENT)
Dept: OUTPATIENT SERVICES | Facility: HOSPITAL | Age: 78
LOS: 1 days | End: 2023-07-13

## 2023-07-13 DIAGNOSIS — I50.42 CHRONIC COMBINED SYSTOLIC (CONGESTIVE) AND DIASTOLIC (CONGESTIVE) HEART FAILURE: ICD-10-CM

## 2023-07-13 PROCEDURE — 93306 TTE W/DOPPLER COMPLETE: CPT | Mod: 26

## 2023-07-17 NOTE — ASSESSMENT
[FreeTextEntry1] : Urinary symptoms: He has tried alpha-blocker therapies and finasteride without much change in his urinary symptoms.  He was concerned about side effects of finasteride and therefore stopped taking it.  His main complaint is nocturia but he seems to be not as significantly bothered by it at this time and he is satisfied with his daytime symptoms.  Therefore he will continue conservatively.  Hematuria: He has undergone work-up with cystoscopy and CT scan.  It appears that hematuria only occurs after masturbation.  He was unable to give us a urine sample today to be sent for urine cytology.  Nephrolithiasis: CT scan had shown stones up to 7 mm in the left kidney.  Again he is asymptomatic.  Treatment options including shockwave lithotripsy and ureteroscopy were discussed.  He will continue to monitor.  No further intervention is needed for renal cysts.  He can follow-up in 6 months.\par \par Jemal George MD, FACS\par [facility]\par  of Urology\par \par [address], [address]\[place] [address]\par \par [address], [address]\[place] [address]\par \par Tel: [phone]\par Fax: [phone]

## 2023-08-20 ENCOUNTER — RX RENEWAL (OUTPATIENT)
Age: 78
End: 2023-08-20

## 2023-10-16 ENCOUNTER — APPOINTMENT (OUTPATIENT)
Dept: CARDIOLOGY | Facility: CLINIC | Age: 78
End: 2023-10-16

## 2023-10-31 ENCOUNTER — APPOINTMENT (OUTPATIENT)
Dept: HEPATOLOGY | Facility: CLINIC | Age: 78
End: 2023-10-31

## 2023-11-10 ENCOUNTER — APPOINTMENT (OUTPATIENT)
Dept: CARDIOLOGY | Facility: CLINIC | Age: 78
End: 2023-11-10

## 2023-12-15 ENCOUNTER — APPOINTMENT (OUTPATIENT)
Dept: CARDIOLOGY | Facility: CLINIC | Age: 78
End: 2023-12-15
Payer: MEDICARE

## 2023-12-15 VITALS
SYSTOLIC BLOOD PRESSURE: 114 MMHG | TEMPERATURE: 98 F | OXYGEN SATURATION: 99 % | BODY MASS INDEX: 25.72 KG/M2 | RESPIRATION RATE: 18 BRPM | DIASTOLIC BLOOD PRESSURE: 80 MMHG | WEIGHT: 174.14 LBS | HEART RATE: 80 BPM

## 2023-12-15 DIAGNOSIS — Z87.448 PERSONAL HISTORY OF OTHER DISEASES OF URINARY SYSTEM: ICD-10-CM

## 2023-12-15 DIAGNOSIS — R49.0 DYSPHONIA: ICD-10-CM

## 2023-12-15 DIAGNOSIS — R31.29 OTHER MICROSCOPIC HEMATURIA: ICD-10-CM

## 2023-12-15 DIAGNOSIS — R74.8 ABNORMAL LEVELS OF OTHER SERUM ENZYMES: ICD-10-CM

## 2023-12-15 DIAGNOSIS — K21.9 GASTRO-ESOPHAGEAL REFLUX DISEASE W/OUT ESOPHAGITIS: ICD-10-CM

## 2023-12-15 DIAGNOSIS — H43.391 OTHER VITREOUS OPACITIES, RIGHT EYE: ICD-10-CM

## 2023-12-15 DIAGNOSIS — Z87.39 PERSONAL HISTORY OF OTHER DISEASES OF THE MUSCULOSKELETAL SYSTEM AND CONNECTIVE TISSUE: ICD-10-CM

## 2023-12-15 DIAGNOSIS — Z96.649 PRESENCE OF UNSPECIFIED ARTIFICIAL HIP JOINT: ICD-10-CM

## 2023-12-15 DIAGNOSIS — B18.2 CHRONIC VIRAL HEPATITIS C: ICD-10-CM

## 2023-12-15 DIAGNOSIS — Z86.69 PERSONAL HISTORY OF OTHER DISEASES OF THE NERVOUS SYSTEM AND SENSE ORGANS: ICD-10-CM

## 2023-12-15 DIAGNOSIS — Z86.79 PERSONAL HISTORY OF OTHER DISEASES OF THE CIRCULATORY SYSTEM: ICD-10-CM

## 2023-12-15 DIAGNOSIS — Z87.898 PERSONAL HISTORY OF OTHER SPECIFIED CONDITIONS: ICD-10-CM

## 2023-12-15 DIAGNOSIS — R09.89 OTHER SPECIFIED SYMPTOMS AND SIGNS INVOLVING THE CIRCULATORY AND RESPIRATORY SYSTEMS: ICD-10-CM

## 2023-12-15 DIAGNOSIS — M26.629 ARTHRALGIA OF TEMPOROMANDIBULAR JOINT,: ICD-10-CM

## 2023-12-15 DIAGNOSIS — H93.12 TINNITUS, LEFT EAR: ICD-10-CM

## 2023-12-15 DIAGNOSIS — Z86.2 PERSONAL HISTORY OF DISEASES OF THE BLOOD AND BLOOD-FORMING ORGANS AND CERTAIN DISORDERS INVOLVING THE IMMUNE MECHANISM: ICD-10-CM

## 2023-12-15 PROCEDURE — 93000 ELECTROCARDIOGRAM COMPLETE: CPT

## 2023-12-15 PROCEDURE — 99215 OFFICE O/P EST HI 40 MIN: CPT | Mod: 25

## 2023-12-15 PROCEDURE — 93010 ELECTROCARDIOGRAM REPORT: CPT

## 2023-12-15 RX ORDER — EMPAGLIFLOZIN 10 MG/1
10 TABLET, FILM COATED ORAL DAILY
Qty: 90 | Refills: 3 | Status: ACTIVE | COMMUNITY
Start: 2023-03-07 | End: 1900-01-01

## 2023-12-15 RX ORDER — SACUBITRIL AND VALSARTAN 24; 26 MG/1; MG/1
24-26 TABLET, FILM COATED ORAL
Qty: 180 | Refills: 2 | Status: ACTIVE | COMMUNITY
Start: 2023-08-20 | End: 1900-01-01

## 2023-12-15 RX ORDER — BISOPROLOL FUMARATE 5 MG/1
5 TABLET, FILM COATED ORAL DAILY
Qty: 60 | Refills: 3 | Status: COMPLETED | COMMUNITY
Start: 2022-12-16 | End: 2023-12-15

## 2023-12-15 RX ORDER — ROSUVASTATIN CALCIUM 10 MG/1
10 TABLET, FILM COATED ORAL DAILY
Qty: 90 | Refills: 3 | Status: ACTIVE | COMMUNITY
Start: 2022-06-21 | End: 1900-01-01

## 2023-12-15 NOTE — PHYSICAL EXAM
[de-identified] : systolic murmur at apex [No Carotid Bruit] : no carotid bruit [No Murmur] : no murmur [No Gallop] : no gallop

## 2023-12-15 NOTE — ASSESSMENT
[FreeTextEntry1] : ------------------------------------------------------------- 78 year old man with chronic heart failure with reduced ejection fraction due to ischemic cardiomyopathy, PVCs, chronic renal insufficiency, and moderate functional MR seen for follow up  Discussed need for medical therapy for ischemic cardiomyopathy to reduce morbidity and mortality and concerns for arrhythmia. Explained role of GDMT and rationale and need for dose titration to maximize efficacy.  LVEF by cardiac MRI was 36 %. Re-assessment by echo in July 2023 after GDMT, perhaps higher. But an ejection fraction was not reported on this study performed at [facility].   Clinically he appears well compensated and euvolemic.  # Ischemic Cardiomyopathy with reduced EF: pro-BNP 1759 pg/mL 12/2022, improved to 654 in July 2023 - continue Entresto to 24/26 BID (Did not tolerate increased dose) - repeat CMP and pro-BNP today - no Aldactone given K and increased Cr - Jardiance 10 mg daily to continue - 12/16/2022: lipid panel LDL 76, HDL 46, total 134 - continue rosuvastatin 10 mg daily - he is willing to try metoprolol again, start Toprol XL 25 mg daily   # PVCs, LGE in lateral wall on MRI and scar on myocardial perfusion stress imaging. - Toprol XL 25 mg daily as tolerated - discussed role for AICD if LVEF remains reduced  Repeat cardiac MRI to re-assess LVEF on maximal medical therapy.   Follow up in 3 months with me  Above discussed with Mr. Wade and all questions answered to the best of my ability and to his apparent satisfaction.

## 2023-12-15 NOTE — REVIEW OF SYSTEMS
[SOB] : no shortness of breath [Dyspnea on exertion] : not dyspnea during exertion [Chest Discomfort] : no chest discomfort [Lower Ext Edema] : no extremity edema [Palpitations] : no palpitations [Syncope] : no syncope [Hematuria] : no hematuria [Under Stress] : under stress [de-identified] : see HPI

## 2023-12-15 NOTE — HISTORY OF PRESENT ILLNESS
[FreeTextEntry1] : Interval History: He had to reschedule his follow up appointment several times due to his wife's health. She was hospitalized at [facility] over the suimmer and is currently admitted to [facility] for suspected infection in the setting of functional quadriplegia due to prior stroke.  Physically he feels well. He denies chest pain, shortness of breath, lost weight, and continues regular exercise. He thinks Jardiance and Entresto have been helpful. He is not taking bisoprolol (caused "fogginess"). He takes Jardiance every other day. He has no edema, syncope, dizziness. He has occasional palpitations.  History: Fuad Wade followed at [facility] for mitral valve prolapse and hypertension. In summer 2022 a monitor and echo showed some abnormality, and he was sent for a nuclear stress test at [facility] which showed evidence of scar. He was prescribed amiodarone but this made him feel terrible so he stopped it. He was then prescribed metoprolol, which caused low blood pressure and fatigue. He had LUTS and was evaluated by Dr. Patel who started tadalafil which Mr. Wade feels helped considerably.  He tells me he is active, exercises on a bicycle and with weights at home and does not note limitations or problems when doing so. He may have had atrial fibrillation on a Holter monitor (he thinks associated with sexual arousal), but never on anticoagulation. He denies syncope.  Denies history of CAD, prior revascularization/stenting. Was under care of Dr. Shearer at [facility]. He is s/p left total hip replacement in  and excision of duodenal pre-cancerous neoplasm in .  2022: Since last visit, he reports feeling well. He denies any chest pain, palpitations. He has been taking one half tablet of metoprolol and irbesartan daily. He finds if he takes the whole tablet he feels unfocused.  Did not schedule cardiac MRI.  2023: Cardiac MRI reviewed with Cardiac Imaging at [facility] and is most consistent with ischemic cardiomyopathy. He denies any new symptoms, but feels stressed by  and caring for his wife who had a stroke. He denies chest pain. He denies exertional dyspnea. Rare palpitations (which he associates with stress). Adherent to irbesartan and bisoprolol.  Apr 10, 2023: Feels good. No chest pain, no palpitations. Began Jardiance and Entresto - tolerating well. Does note polyuria after starting Jardiance, which is tolerable. Zio monitor results reviewed, frequent VPCs, run of NSVT. No afib. No significant symptoms during monitoring.  2023: He continued to feel foggy so PMD advised him to decrease dose of bisoprolol to 5 and to take only 1/2 tablet of Entresto twice daily, as BP was 90s-100s. He found this helpful. Recent labs done by his nephrologist showed Cr 1.7, increased from 1.5, K 5.3, A1c 5.4 %. Otherwise he feels good. 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   Cardiovascular Summary: ---------------------------------------------- EC2023: sinus 80 with occasional PVCs, low voltage QRS, anterolateral TW abnormality 2023: sinus 57 bpm w sinus arrhythmia, low voltage lateral TWI 4/10/2023: sinus amy 54 bpm, lateral TWI, anterior infarct, age undetermined, PVCs 3/7/2023: sinus bradycardia 59 bpm, frequent PVCs, lateral TWI, possible anterior infarct 2022: sinus with PVCs and PACs, lateral TWI, possible anterior infarct 2022: NSR 60 bpm, occasional PVCs, low voltage, anterior and lateral infarct age undetermined ---------------------------------------------- Stress: 2022: exercise MPI stress, 6 METS (6 minutes Quintin), moderate inferior, apical, lateral infarct no ischemia ---------------------------------------------- Echo: 2023: moderate segmental LV dysfunction (inferior, inferolateral, basal lateral), mod MR, LVIDd 5.9 cm,  11/10/2022: LVEF 43 %, moderate MR, segmental LV hypokinesis, moderate concentric LVH, moderate PHTN ---------------------------------------------- MRI 2022: LGE in lateral wall, in the septum, and inferior wall, and LGE within apex, LVEF 34 % ---------------------------------------------- Remote/ambulatory rhythm monitoring: 3/2023: Zio 14 days: No Afib. NSVT 11.8 seconds, frequent PVCs (8.2%), bigeminy, trigeminy

## 2023-12-15 NOTE — REASON FOR VISIT
[FreeTextEntry3] : Dr. Arteaga [FreeTextEntry1] : ----------------------------------------------------------------------- MYA LUKE is an 78 year old man with a history of HCV s/p ribavirin and ledipasvir/sofosbuvir, aplastic anemia, hypertension, hyperlipidemia, and chronic kidney disease who presents for follow up of ischemic cardiomyopathy.

## 2023-12-16 LAB
ALBUMIN SERPL ELPH-MCNC: 4.5 G/DL
ALP BLD-CCNC: 104 U/L
ALT SERPL-CCNC: 18 U/L
ANION GAP SERPL CALC-SCNC: 12 MMOL/L
AST SERPL-CCNC: 24 U/L
BILIRUB SERPL-MCNC: 0.8 MG/DL
BUN SERPL-MCNC: 17 MG/DL
CALCIUM SERPL-MCNC: 10.3 MG/DL
CHLORIDE SERPL-SCNC: 106 MMOL/L
CHOLEST SERPL-MCNC: 122 MG/DL
CO2 SERPL-SCNC: 22 MMOL/L
CREAT SERPL-MCNC: 1.39 MG/DL
EGFR: 51 ML/MIN/1.73M2
ESTIMATED AVERAGE GLUCOSE: 105 MG/DL
GLUCOSE SERPL-MCNC: 83 MG/DL
HBA1C MFR BLD HPLC: 5.3 %
HDLC SERPL-MCNC: 53 MG/DL
LDLC SERPL CALC-MCNC: 58 MG/DL
MAGNESIUM SERPL-MCNC: 2.2 MG/DL
NONHDLC SERPL-MCNC: 70 MG/DL
NT-PROBNP SERPL-MCNC: 946 PG/ML
POTASSIUM SERPL-SCNC: 5.3 MMOL/L
PROT SERPL-MCNC: 7.1 G/DL
SODIUM SERPL-SCNC: 139 MMOL/L
TRIGL SERPL-MCNC: 49 MG/DL

## 2024-01-30 ENCOUNTER — OUTPATIENT (OUTPATIENT)
Dept: OUTPATIENT SERVICES | Facility: HOSPITAL | Age: 79
LOS: 1 days | Discharge: HOME | End: 2024-01-30
Payer: MEDICARE

## 2024-01-30 DIAGNOSIS — D61.9 APLASTIC ANEMIA, UNSPECIFIED: ICD-10-CM

## 2024-02-08 ENCOUNTER — RESULT REVIEW (OUTPATIENT)
Age: 79
End: 2024-02-08

## 2024-02-08 ENCOUNTER — APPOINTMENT (OUTPATIENT)
Dept: HEMATOLOGY ONCOLOGY | Facility: CLINIC | Age: 79
End: 2024-02-08
Payer: MEDICARE

## 2024-02-08 VITALS
HEART RATE: 71 BPM | RESPIRATION RATE: 17 BRPM | BODY MASS INDEX: 25.22 KG/M2 | DIASTOLIC BLOOD PRESSURE: 76 MMHG | SYSTOLIC BLOOD PRESSURE: 114 MMHG | WEIGHT: 170.75 LBS | TEMPERATURE: 97 F | OXYGEN SATURATION: 98 %

## 2024-02-08 LAB
BASOPHILS # BLD AUTO: 0.01 K/UL — SIGNIFICANT CHANGE UP (ref 0–0.2)
BASOPHILS NFR BLD AUTO: 0.2 % — SIGNIFICANT CHANGE UP (ref 0–2)
EOSINOPHIL # BLD AUTO: 0.03 K/UL — SIGNIFICANT CHANGE UP (ref 0–0.5)
EOSINOPHIL NFR BLD AUTO: 0.6 % — SIGNIFICANT CHANGE UP (ref 0–6)
HCT VFR BLD CALC: 39.3 % — SIGNIFICANT CHANGE UP (ref 39–50)
HGB BLD-MCNC: 13.1 G/DL — SIGNIFICANT CHANGE UP (ref 13–17)
IMM GRANULOCYTES NFR BLD AUTO: 0.4 % — SIGNIFICANT CHANGE UP (ref 0–0.9)
LYMPHOCYTES # BLD AUTO: 1.43 K/UL — SIGNIFICANT CHANGE UP (ref 1–3.3)
LYMPHOCYTES # BLD AUTO: 30.2 % — SIGNIFICANT CHANGE UP (ref 13–44)
MCHC RBC-ENTMCNC: 33.3 G/DL — SIGNIFICANT CHANGE UP (ref 32–36)
MCHC RBC-ENTMCNC: 34.7 PG — HIGH (ref 27–34)
MCV RBC AUTO: 104.2 FL — HIGH (ref 80–100)
MONOCYTES # BLD AUTO: 0.42 K/UL — SIGNIFICANT CHANGE UP (ref 0–0.9)
MONOCYTES NFR BLD AUTO: 8.9 % — SIGNIFICANT CHANGE UP (ref 2–14)
NEUTROPHILS # BLD AUTO: 2.83 K/UL — SIGNIFICANT CHANGE UP (ref 1.8–7.4)
NEUTROPHILS NFR BLD AUTO: 59.7 % — SIGNIFICANT CHANGE UP (ref 43–77)
NRBC # BLD: 0 /100 WBCS — SIGNIFICANT CHANGE UP (ref 0–0)
PLATELET # BLD AUTO: 98 K/UL — LOW (ref 150–400)
RBC # BLD: 3.77 M/UL — LOW (ref 4.2–5.8)
RBC # FLD: 12.4 % — SIGNIFICANT CHANGE UP (ref 10.3–14.5)
WBC # BLD: 4.74 K/UL — SIGNIFICANT CHANGE UP (ref 3.8–10.5)
WBC # FLD AUTO: 4.74 K/UL — SIGNIFICANT CHANGE UP (ref 3.8–10.5)

## 2024-02-08 PROCEDURE — 99214 OFFICE O/P EST MOD 30 MIN: CPT

## 2024-02-08 PROCEDURE — G2211 COMPLEX E/M VISIT ADD ON: CPT | Mod: NC

## 2024-02-08 RX ORDER — TADALAFIL 5 MG/1
5 TABLET ORAL
Qty: 90 | Refills: 3 | Status: DISCONTINUED | COMMUNITY
Start: 2022-05-04 | End: 2024-02-08

## 2024-02-08 NOTE — REVIEW OF SYSTEMS
[Negative] : Allergic/Immunologic [FreeTextEntry4] : L ear tinnitus - chronic without change. MRIs negative 2015. [FreeTextEntry9] : mild RT hip pain.  Patient continues to see chiropractor. Exercises on his own. [de-identified] : tinnitus left ear.

## 2024-02-08 NOTE — ASSESSMENT
[FreeTextEntry1] : APLASTIC ANEMIA He developed aplastic anemia following Interferon/ribavirin treatments for chronic hepatitis C virus.  He was treated with the NCI regimen of ATG, cyclosporin and Solumedrol on July 18, 2005. Aplastic anemia remains in remission since that time. CBC monitor 2x/year.  GASTRINOMA Gastrinoma resected in 2000. Will monitor for relapse with serum gastrin levels 1-2x/year. Gastrin level has been normal - last was 4/2022 Will repeat Gastrin level today. Patient only had protein shake in the AM  CARDIAC Cardiac MRI most consistent with ischemic cardiomyopathy 2023. He denies any new symptoms.  He denies exertional dyspnea. Rare palpitations (which he associates with stress).  He was on Bisoprolol, and stopped due to weakness. On Apr 2023: Began Jardiance and Entresto - tolerating well. Does note polyuria after starting Jardiance, which is tolerable. Zio monitor results reviewed, frequent VPCs, run of NSVT. No AFib. He states that he continues to lose weight from this. He feels uncomfortable on the metoprolol and thinks it causes abdominal pains. He stopped taking it 2 weeks and feels better off of it.  HCV  in remission Of note, HBV - lab tests show prior HBV infection that resolved.  HEMOSIDEROSIS Ferritin is 258 is most recent.
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
## 2024-02-09 LAB
ALBUMIN SERPL ELPH-MCNC: 4 G/DL
ALP BLD-CCNC: 116 U/L
ALT SERPL-CCNC: 16 U/L
ANION GAP SERPL CALC-SCNC: 12 MMOL/L
AST SERPL-CCNC: 18 U/L
BILIRUB SERPL-MCNC: 0.4 MG/DL
BUN SERPL-MCNC: 19 MG/DL
CALCIUM SERPL-MCNC: 9.5 MG/DL
CHLORIDE SERPL-SCNC: 108 MMOL/L
CO2 SERPL-SCNC: 23 MMOL/L
CREAT SERPL-MCNC: 1.32 MG/DL
EGFR: 55 ML/MIN/1.73M2
FERRITIN SERPL-MCNC: 516 NG/ML
GLUCOSE SERPL-MCNC: 107 MG/DL
NT-PROBNP SERPL-MCNC: 695 PG/ML
POTASSIUM SERPL-SCNC: 5.2 MMOL/L
PROT SERPL-MCNC: 6.6 G/DL
SODIUM SERPL-SCNC: 143 MMOL/L

## 2024-02-13 LAB
ALBUMIN MFR SERPL ELPH: 55.3 %
ALBUMIN SERPL-MCNC: 3.6 G/DL
ALBUMIN/GLOB SERPL: 1.2 RATIO
ALPHA1 GLOB MFR SERPL ELPH: 4.4 %
ALPHA1 GLOB SERPL ELPH-MCNC: 0.3 G/DL
ALPHA2 GLOB MFR SERPL ELPH: 12.8 %
ALPHA2 GLOB SERPL ELPH-MCNC: 0.8 G/DL
B-GLOBULIN MFR SERPL ELPH: 10.3 %
B-GLOBULIN SERPL ELPH-MCNC: 0.7 G/DL
DEPRECATED KAPPA LC FREE/LAMBDA SER: 1.6 RATIO
GAMMA GLOB FLD ELPH-MCNC: 1.1 G/DL
GAMMA GLOB MFR SERPL ELPH: 17.2 %
GASTRIN SERPL-MCNC: 140 PG/ML
IGA SER QL IEP: 208 MG/DL
IGG SER QL IEP: 1134 MG/DL
IGM SER QL IEP: 218 MG/DL
INTERPRETATION SERPL IEP-IMP: NORMAL
KAPPA LC CSF-MCNC: 1.76 MG/DL
KAPPA LC SERPL-MCNC: 2.81 MG/DL
LDH SERPL-CCNC: 177 IU/L
LDH1 CFR SERPL ELPH: 35 %
LDH2 CFR SERPL ELPH: 32 %
LDH3 CFR SERPL ELPH: 20 %
LDH4 CFR SERPL ELPH: 6 %
LDH5 CFR SERPL ELPH: 7 %
M PROTEIN SPEC IFE-MCNC: NORMAL
PROT SERPL-MCNC: 6.6 G/DL
PROT SERPL-MCNC: 6.6 G/DL

## 2024-02-16 ENCOUNTER — APPOINTMENT (OUTPATIENT)
Dept: HEMATOLOGY ONCOLOGY | Facility: CLINIC | Age: 79
End: 2024-02-16

## 2024-02-21 LAB — GASTRIN SERPL-MCNC: 71 PG/ML

## 2024-04-01 ENCOUNTER — RX RENEWAL (OUTPATIENT)
Age: 79
End: 2024-04-01

## 2024-04-15 ENCOUNTER — APPOINTMENT (OUTPATIENT)
Dept: CARDIOLOGY | Facility: CLINIC | Age: 79
End: 2024-04-15
Payer: COMMERCIAL

## 2024-04-15 VITALS
TEMPERATURE: 97.4 F | WEIGHT: 178.57 LBS | OXYGEN SATURATION: 99 % | BODY MASS INDEX: 26.45 KG/M2 | HEIGHT: 69 IN | HEART RATE: 71 BPM | SYSTOLIC BLOOD PRESSURE: 122 MMHG | DIASTOLIC BLOOD PRESSURE: 77 MMHG

## 2024-04-15 DIAGNOSIS — I49.3 VENTRICULAR PREMATURE DEPOLARIZATION: ICD-10-CM

## 2024-04-15 DIAGNOSIS — I50.42 CHRONIC COMBINED SYSTOLIC (CONGESTIVE) AND DIASTOLIC (CONGESTIVE) HEART FAILURE: ICD-10-CM

## 2024-04-15 DIAGNOSIS — I10 ESSENTIAL (PRIMARY) HYPERTENSION: ICD-10-CM

## 2024-04-15 DIAGNOSIS — I34.0 NONRHEUMATIC MITRAL (VALVE) INSUFFICIENCY: ICD-10-CM

## 2024-04-15 DIAGNOSIS — I25.5 ISCHEMIC CARDIOMYOPATHY: ICD-10-CM

## 2024-04-15 PROCEDURE — 93000 ELECTROCARDIOGRAM COMPLETE: CPT

## 2024-04-15 PROCEDURE — 93010 ELECTROCARDIOGRAM REPORT: CPT | Mod: 59

## 2024-04-15 PROCEDURE — G2211 COMPLEX E/M VISIT ADD ON: CPT | Mod: NC

## 2024-04-15 PROCEDURE — 99215 OFFICE O/P EST HI 40 MIN: CPT | Mod: 25

## 2024-04-15 RX ORDER — METOPROLOL SUCCINATE 25 MG/1
25 TABLET, EXTENDED RELEASE ORAL
Qty: 90 | Refills: 1 | Status: COMPLETED | COMMUNITY
Start: 2023-12-15 | End: 2024-04-15

## 2024-04-15 NOTE — PHYSICAL EXAM
[Normal] : normal conjunctiva [Normal Venous Pressure] : normal venous pressure [No Carotid Bruit] : no carotid bruit [Normal S1, S2] : normal S1, S2 [No Murmur] : no murmur [No Gallop] : no gallop [Clear Lung Fields] : clear lung fields [Good Air Entry] : good air entry [No Respiratory Distress] : no respiratory distress  [Normal Gait] : normal gait [Gait - Sufficient for Exercise Testing] : gait - sufficient for exercise testing [No Edema] : no edema [No Cyanosis] : no cyanosis [No Clubbing] : no clubbing [No Varicosities] : no varicosities [No Rash] : no rash [Moves all extremities] : moves all extremities [No Focal Deficits] : no focal deficits [Normal Speech] : normal speech [Alert and Oriented] : alert and oriented [No Rub] : no rub

## 2024-04-15 NOTE — ASSESSMENT
[FreeTextEntry1] : ------------------------------------------------------------- 78 year old man with chronic heart failure with reduced ejection fraction due to ischemic cardiomyopathy, PVCs, chronic renal insufficiency, and moderate functional MR seen for follow up. He has been non-adherent to recommended medical therapy. We discussed this in detail today, including the guideline recommendations for treatment of his heart condition to prevent the risk of progression, arrhythmia, and death. He reports feeling well physically and does not want to increase or change the current medications at this time.   Discussed need for medical therapy for ischemic cardiomyopathy to reduce morbidity and mortality and concerns for arrhythmia. Explained role of GDMT and rationale and need for dose titration to maximize efficacy.  LVEF by cardiac MRI was 36 %. Re-assessment by echo in July 2023 after GDMT, perhaps higher. But an ejection fraction was not reported on this study performed at [facility].   Clinically he does appear well compensated and euvolemic.  # Ischemic Cardiomyopathy with reduced EF: pro-BNP 1759 pg/mL 12/2022, improved to 654 in July 2023 - continue Entresto to 24/26 BID (Did not tolerate increased dose) - no Aldactone given K and increased Cr - Jardiance 10 mg daily to continue (advised to take full dose) - 12/16/2022: lipid panel LDL 76, HDL 46, total 134 - continue rosuvastatin 10 mg daily - he is not willing to take a beta blocker again  # PVCs, LGE in lateral wall on MRI and scar on myocardial perfusion stress imaging. - did not tolerate BB - discussed role for AICD if LVEF remains reduced  Follow up in 6 months with me. If he is willing to consider an AICD, will repeat assessment of LVEF at that time.  Above discussed with Surya Mauro and all questions answered to the best of my ability and to his apparent satisfaction.

## 2024-04-15 NOTE — HISTORY OF PRESENT ILLNESS
[FreeTextEntry1] : Interval History: His wife passed away last month after long illness due to complications from prior stroke. He is grieving. He stopped taking metoprolol due to abdominal pain, associated with increased gastrin level. On repeat gastrin was lower. But he did not resume metoprolol. He reports not having shortness of breath, edema, palpitations, and that his exercise tolerance and stamina are good. He feels the Entresto helped. He is only taking one half tablet of Jardiance each day.   History: Fuad Wade followed at [facility] for mitral valve prolapse and hypertension. In summer 2022 a monitor and echo showed some abnormality, and he was sent for a nuclear stress test at [facility] which showed evidence of scar. He was prescribed amiodarone but this made him feel terrible so he stopped it. He was then prescribed metoprolol, which caused low blood pressure and fatigue. He had LUTS and was evaluated by Dr. Patel who started tadalafil which Mr. Wade feels helped considerably.  He tells me he is active, exercises on a bicycle and with weights at home and does not note limitations or problems when doing so. He may have had atrial fibrillation on a Holter monitor (he thinks associated with sexual arousal), but never on anticoagulation. He denies syncope.  Denies history of CAD, prior revascularization/stenting. Was under care of Dr. Shearer at [facility]. He is s/p left total hip replacement in  and excision of duodenal pre-cancerous neoplasm in .  2022: Since last visit, he reports feeling well. He denies any chest pain, palpitations. He has been taking one half tablet of metoprolol and irbesartan daily. He finds if he takes the whole tablet he feels unfocused.  Did not schedule cardiac MRI.  2023: Cardiac MRI reviewed with Cardiac Imaging at [facility] and is most consistent with ischemic cardiomyopathy. He denies any new symptoms, but feels stressed by  and caring for his wife who had a stroke. He denies chest pain. He denies exertional dyspnea. Rare palpitations (which he associates with stress). Adherent to irbesartan and bisoprolol.  Apr 10, 2023: Feels good. No chest pain, no palpitations. Began Jardiance and Entresto - tolerating well. Does note polyuria after starting Jardiance, which is tolerable. Zio monitor results reviewed, frequent VPCs, run of NSVT. No afib. No significant symptoms during monitoring.  2023: He continued to feel foggy so PMD advised him to decrease dose of bisoprolol to 5 and to take only 1/2 tablet of Entresto twice daily, as BP was 90s-100s. He found this helpful. Recent labs done by his nephrologist showed Cr 1.7, increased from 1.5, K 5.3, A1c 5.4 %. Otherwise he feels good. 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  2023: He had to reschedule his follow up appointment several times due to his wife's health. She was hospitalized at [facility] and is currently admitted to [facility] for suspected infection in the setting of functional quadriplegia due to prior stroke.  Physically he feels well. He denies chest pain, shortness of breath, lost weight, and continues regular exercise. He thinks Jardiance and Entresto have been helpful. He is not taking bisoprolol (caused "fogginess"). He takes Jardiance every other day. He has no edema, syncope, dizziness. He has occasional palpitations.   Cardiovascular Summary: ---------------------------------------------- EC/15/2024: sinus 81 bpm with PACs, low voltage QRS, anterolateral TW abnormality 2023: sinus 80 with occasional PVCs, low voltage QRS, anterolateral TW abnormality 2023: sinus 57 bpm w sinus arrhythmia, low voltage lateral TWI 4/10/2023: sinus amy 54 bpm, lateral TWI, anterior infarct, age undetermined, PVCs 3/7/2023: sinus bradycardia 59 bpm, frequent PVCs, lateral TWI, possible anterior infarct 2022: sinus with PVCs and PACs, lateral TWI, possible anterior infarct 2022: NSR 60 bpm, occasional PVCs, low voltage, anterior and lateral infarct age undetermined ---------------------------------------------- Stress: 2022: exercise MPI stress, 6 METS (6 minutes Quintin), moderate inferior, apical, lateral infarct no ischemia ---------------------------------------------- Echo: 2023: moderate segmental LV dysfunction (inferior, inferolateral, basal lateral), mod MR, LVIDd 5.9 cm,  11/10/2022: LVEF 43 %, moderate MR, segmental LV hypokinesis, moderate concentric LVH, moderate PHTN ---------------------------------------------- MRI 2022: LGE in lateral wall, in the septum, and inferior wall, and LGE within apex, LVEF 34 % ---------------------------------------------- Remote/ambulatory rhythm monitoring: 3/2023: Zio 14 days: No Afib. NSVT 11.8 seconds, frequent PVCs (8.2%), bigeminy, trigeminy

## 2024-04-15 NOTE — REVIEW OF SYSTEMS
[Urinary Frequency] : urinary frequency [Erectile Dysfunction] : erectile dysfunction [Nocturia] : nocturia [Under Stress] : under stress [Negative] : Psychiatric [SOB] : no shortness of breath [Dyspnea on exertion] : not dyspnea during exertion [Chest Discomfort] : no chest discomfort [Lower Ext Edema] : no extremity edema [Palpitations] : no palpitations [Syncope] : no syncope [Hematuria] : no hematuria [de-identified] : see HPI

## 2024-04-16 ENCOUNTER — OUTPATIENT (OUTPATIENT)
Dept: OUTPATIENT SERVICES | Facility: HOSPITAL | Age: 79
LOS: 1 days | Discharge: HOME | End: 2024-04-16
Payer: MEDICARE

## 2024-04-16 ENCOUNTER — NON-APPOINTMENT (OUTPATIENT)
Age: 79
End: 2024-04-16

## 2024-04-16 DIAGNOSIS — D61.9 APLASTIC ANEMIA, UNSPECIFIED: ICD-10-CM

## 2024-04-17 ENCOUNTER — RESULT REVIEW (OUTPATIENT)
Age: 79
End: 2024-04-17

## 2024-04-17 ENCOUNTER — APPOINTMENT (OUTPATIENT)
Dept: HEMATOLOGY ONCOLOGY | Facility: CLINIC | Age: 79
End: 2024-04-17
Payer: MEDICARE

## 2024-04-17 VITALS
SYSTOLIC BLOOD PRESSURE: 131 MMHG | RESPIRATION RATE: 17 BRPM | OXYGEN SATURATION: 98 % | BODY MASS INDEX: 26.05 KG/M2 | WEIGHT: 176.37 LBS | TEMPERATURE: 97.5 F | HEART RATE: 65 BPM | DIASTOLIC BLOOD PRESSURE: 74 MMHG

## 2024-04-17 DIAGNOSIS — D61.9 APLASTIC ANEMIA, UNSPECIFIED: ICD-10-CM

## 2024-04-17 DIAGNOSIS — D37.9 NEOPLASM OF UNCERTAIN BEHAVIOR OF DIGESTIVE ORGAN, UNSPECIFIED: ICD-10-CM

## 2024-04-17 LAB
BASOPHILS # BLD AUTO: 0.02 K/UL — SIGNIFICANT CHANGE UP (ref 0–0.2)
BASOPHILS NFR BLD AUTO: 0.4 % — SIGNIFICANT CHANGE UP (ref 0–2)
EOSINOPHIL # BLD AUTO: 0.06 K/UL — SIGNIFICANT CHANGE UP (ref 0–0.5)
EOSINOPHIL NFR BLD AUTO: 1.2 % — SIGNIFICANT CHANGE UP (ref 0–6)
HCT VFR BLD CALC: 42.8 % — SIGNIFICANT CHANGE UP (ref 39–50)
HGB BLD-MCNC: 14.5 G/DL — SIGNIFICANT CHANGE UP (ref 13–17)
IMM GRANULOCYTES NFR BLD AUTO: 0.2 % — SIGNIFICANT CHANGE UP (ref 0–0.9)
LYMPHOCYTES # BLD AUTO: 1.99 K/UL — SIGNIFICANT CHANGE UP (ref 1–3.3)
LYMPHOCYTES # BLD AUTO: 38.2 % — SIGNIFICANT CHANGE UP (ref 13–44)
MCHC RBC-ENTMCNC: 33.9 G/DL — SIGNIFICANT CHANGE UP (ref 32–36)
MCHC RBC-ENTMCNC: 35.6 PG — HIGH (ref 27–34)
MCV RBC AUTO: 105.2 FL — HIGH (ref 80–100)
MONOCYTES # BLD AUTO: 0.55 K/UL — SIGNIFICANT CHANGE UP (ref 0–0.9)
MONOCYTES NFR BLD AUTO: 10.6 % — SIGNIFICANT CHANGE UP (ref 2–14)
NEUTROPHILS # BLD AUTO: 2.58 K/UL — SIGNIFICANT CHANGE UP (ref 1.8–7.4)
NEUTROPHILS NFR BLD AUTO: 49.4 % — SIGNIFICANT CHANGE UP (ref 43–77)
NRBC # BLD: 0 /100 WBCS — SIGNIFICANT CHANGE UP (ref 0–0)
PLATELET # BLD AUTO: 97 K/UL — LOW (ref 150–400)
RBC # BLD: 4.07 M/UL — LOW (ref 4.2–5.8)
RBC # FLD: 12.9 % — SIGNIFICANT CHANGE UP (ref 10.3–14.5)
WBC # BLD: 5.21 K/UL — SIGNIFICANT CHANGE UP (ref 3.8–10.5)
WBC # FLD AUTO: 5.21 K/UL — SIGNIFICANT CHANGE UP (ref 3.8–10.5)

## 2024-04-17 PROCEDURE — 99214 OFFICE O/P EST MOD 30 MIN: CPT

## 2024-04-17 PROCEDURE — G2211 COMPLEX E/M VISIT ADD ON: CPT | Mod: NC

## 2024-04-17 NOTE — ASSESSMENT
[FreeTextEntry1] : APLASTIC ANEMIA He developed aplastic anemia following Interferon/ribavirin treatments for chronic hepatitis C virus.  He was treated with the NCI regimen of ATG, cyclosporin and Solumedrol on July 18, 2005. Aplastic anemia remains in remission since that time. CBC monitor 2x/year.  GASTRINOMA Gastrinoma resected in 2000. Will monitor for relapse with serum gastrin levels 1-2x/year. Gastrin level has been normal - last was 4/2022 Will repeat Gastrin level today (he is completely fasting).  CARDIAC Cardiac MRI most consistent with ischemic cardiomyopathy 2023. He denies any new symptoms.  He denies exertional dyspnea. Rare palpitations (which he associates with stress).  He was on Bisoprolol, and stopped due to weakness. On Apr 2023: Began Jardiance and Entresto - tolerating well. Does note polyuria after starting Jardiance, which is tolerable.   HCV  in remission Of note, HBV - lab tests show prior HBV infection that resolved.  HEMOSIDEROSIS Ferritin is 258 is most recent.
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
## 2024-04-17 NOTE — REVIEW OF SYSTEMS
[Negative] : Allergic/Immunologic [FreeTextEntry4] : L ear tinnitus - chronic without change. MRIs negative 2015. [FreeTextEntry9] : mild RT hip pain.  Patient continues to see chiropractor. Exercises on his own. [de-identified] : tinnitus left ear.

## 2024-04-22 LAB — GASTRIN SERPL-MCNC: 50 PG/ML

## 2024-04-23 ENCOUNTER — APPOINTMENT (OUTPATIENT)
Dept: HEMATOLOGY ONCOLOGY | Facility: CLINIC | Age: 79
End: 2024-04-23

## 2024-04-23 LAB
ALBUMIN SERPL ELPH-MCNC: 4.1 G/DL
ALP BLD-CCNC: 146 U/L
ALT SERPL-CCNC: 12 U/L
ANION GAP SERPL CALC-SCNC: 11 MMOL/L
AST SERPL-CCNC: 17 U/L
BILIRUB SERPL-MCNC: 0.4 MG/DL
BUN SERPL-MCNC: 22 MG/DL
CALCIUM SERPL-MCNC: 9.8 MG/DL
CHLORIDE SERPL-SCNC: 108 MMOL/L
CO2 SERPL-SCNC: 23 MMOL/L
CREAT SERPL-MCNC: 1.51 MG/DL
EGFR: 46 ML/MIN/1.73M2
FERRITIN SERPL-MCNC: 511 NG/ML
GLUCOSE SERPL-MCNC: 102 MG/DL
LDH SERPL-CCNC: 162 U/L
NT-PROBNP SERPL-MCNC: 378 PG/ML
POTASSIUM SERPL-SCNC: 5.3 MMOL/L
PROT SERPL-MCNC: 6.4 G/DL
SODIUM SERPL-SCNC: 142 MMOL/L

## 2024-04-29 RX ORDER — SACUBITRIL AND VALSARTAN 49; 51 MG/1; MG/1
49-51 TABLET, FILM COATED ORAL TWICE DAILY
Qty: 180 | Refills: 2 | Status: ACTIVE | COMMUNITY
Start: 2023-03-07 | End: 1900-01-01

## 2024-05-20 RX ORDER — FLUTICASONE PROPIONATE 50 UG/1
50 SPRAY, METERED NASAL
Qty: 48 | Refills: 0 | Status: ACTIVE | COMMUNITY
Start: 2022-04-07 | End: 1900-01-01

## 2024-08-09 ENCOUNTER — OUTPATIENT (OUTPATIENT)
Dept: OUTPATIENT SERVICES | Facility: HOSPITAL | Age: 79
LOS: 1 days | Discharge: HOME | End: 2024-08-09

## 2024-08-09 DIAGNOSIS — D61.9 APLASTIC ANEMIA, UNSPECIFIED: ICD-10-CM

## 2024-08-15 ENCOUNTER — APPOINTMENT (OUTPATIENT)
Dept: HEMATOLOGY ONCOLOGY | Facility: CLINIC | Age: 79
End: 2024-08-15
Payer: MEDICARE

## 2024-08-15 VITALS
WEIGHT: 188.13 LBS | SYSTOLIC BLOOD PRESSURE: 134 MMHG | BODY MASS INDEX: 27.78 KG/M2 | HEART RATE: 62 BPM | OXYGEN SATURATION: 97 % | DIASTOLIC BLOOD PRESSURE: 73 MMHG | TEMPERATURE: 96.4 F | RESPIRATION RATE: 16 BRPM

## 2024-08-15 DIAGNOSIS — D61.9 APLASTIC ANEMIA, UNSPECIFIED: ICD-10-CM

## 2024-08-15 PROCEDURE — G2211 COMPLEX E/M VISIT ADD ON: CPT

## 2024-08-15 PROCEDURE — 99214 OFFICE O/P EST MOD 30 MIN: CPT

## 2024-08-15 NOTE — ASSESSMENT
[FreeTextEntry1] : APLASTIC ANEMIA He developed aplastic anemia following Interferon/ribavirin treatments for chronic hepatitis C virus.  He was treated with the NCI regimen of ATG, cyclosporin and Solumedrol on July 18, 2005. Aplastic anemia remains in remission since that time. CBC monitor 2x/year.  GASTRINOMA Gastrinoma resected in 2000. Will monitor for relapse with serum gastrin levels 1-2x/year. Gastrin level has been normal - last was 4/2022 Will repeat Gastrin level today (he is completely fasting).  CARDIAC Cardiac MRI most consistent with ischemic cardiomyopathy 2023. He denies any new symptoms.  He denies exertional dyspnea. Rare palpitations (which he associates with stress).  He was on Bisoprolol, and stopped due to weakness. On Apr 2023: Began Jardiance and Entresto - tolerating well. Does note polyuria after starting Jardiance, which is tolerable.   HCV  in remission Of note, HBV - lab tests show prior HBV infection that resolved.

## 2024-08-15 NOTE — BEGINNING OF VISIT
[0] : 1) Little interest or pleasure doing things: Not at all (0) [1] : 2) Feeling down, depressed, or hopeless for several days (1) [PHQ-2 Negative] : PHQ-2 Negative [PHQ-9 Negative] : PHQ-9 Negative [[id number]] : 1 [Pain Scale: ___] : On a scale of 1-10, today the patient's pain is a(n) [unfilled]. [Former] : Former [15-19] : 15-19 [> 15 Years] : > 15 Years [Date Discussed (MM/DD/YY): ___] : Discussed: [unfilled] [With Patient/Caregiver] : with Patient/Caregiver
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
## 2024-08-15 NOTE — REVIEW OF SYSTEMS
[Negative] : Allergic/Immunologic [FreeTextEntry4] : L ear tinnitus - chronic without change. MRIs negative 2015. [FreeTextEntry9] : mild RT hip pain.  Patient continues to see chiropractor. Exercises on his own. [de-identified] : tinnitus left ear.

## 2024-08-15 NOTE — BEGINNING OF VISIT
[0] : 1) Little interest or pleasure doing things: Not at all (0) [1] : 2) Feeling down, depressed, or hopeless for several days (1) [PHQ-2 Negative] : PHQ-2 Negative [PHQ-9 Negative] : PHQ-9 Negative [[id number]] : 1 [Pain Scale: ___] : On a scale of 1-10, today the patient's pain is a(n) [unfilled]. [Former] : Former [15-19] : 15-19 [> 15 Years] : > 15 Years [Date Discussed (MM/DD/YY): ___] : Discussed: [unfilled] [With Patient/Caregiver] : with Patient/Caregiver
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
## 2024-08-15 NOTE — REVIEW OF SYSTEMS
[Negative] : Allergic/Immunologic [FreeTextEntry4] : L ear tinnitus - chronic without change. MRIs negative 2015. [FreeTextEntry9] : mild RT hip pain.  Patient continues to see chiropractor. Exercises on his own. [de-identified] : tinnitus left ear.

## 2024-08-27 ENCOUNTER — RX RENEWAL (OUTPATIENT)
Age: 79
End: 2024-08-27

## 2024-10-04 ENCOUNTER — APPOINTMENT (OUTPATIENT)
Dept: UROLOGY | Facility: CLINIC | Age: 79
End: 2024-10-04

## 2024-10-04 PROCEDURE — ZZZZZ: CPT

## 2024-10-11 ENCOUNTER — NON-APPOINTMENT (OUTPATIENT)
Age: 79
End: 2024-10-11

## 2024-10-23 ENCOUNTER — APPOINTMENT (OUTPATIENT)
Dept: UROLOGY | Facility: CLINIC | Age: 79
End: 2024-10-23

## 2024-11-26 ENCOUNTER — APPOINTMENT (OUTPATIENT)
Dept: UROLOGY | Facility: CLINIC | Age: 79
End: 2024-11-26
Payer: MEDICARE

## 2024-11-26 VITALS
TEMPERATURE: 97.2 F | RESPIRATION RATE: 16 BRPM | DIASTOLIC BLOOD PRESSURE: 93 MMHG | HEART RATE: 92 BPM | SYSTOLIC BLOOD PRESSURE: 150 MMHG

## 2024-11-26 DIAGNOSIS — N40.1 BENIGN PROSTATIC HYPERPLASIA WITH LOWER URINARY TRACT SYMPMS: ICD-10-CM

## 2024-11-26 DIAGNOSIS — R35.0 BENIGN PROSTATIC HYPERPLASIA WITH LOWER URINARY TRACT SYMPMS: ICD-10-CM

## 2024-11-26 PROCEDURE — 51741 ELECTRO-UROFLOWMETRY FIRST: CPT

## 2024-11-26 PROCEDURE — 99213 OFFICE O/P EST LOW 20 MIN: CPT

## 2024-11-26 PROCEDURE — G2211 COMPLEX E/M VISIT ADD ON: CPT

## 2024-11-26 PROCEDURE — 51798 US URINE CAPACITY MEASURE: CPT

## 2024-11-26 RX ORDER — TERAZOSIN 5 MG/1
5 CAPSULE ORAL
Qty: 30 | Refills: 5 | Status: ACTIVE | COMMUNITY
Start: 2024-11-26 | End: 1900-01-01

## 2024-11-26 RX ORDER — TERAZOSIN 2 MG/1
2 CAPSULE ORAL
Qty: 7 | Refills: 0 | Status: ACTIVE | COMMUNITY
Start: 2024-11-26 | End: 1900-01-01

## 2024-12-04 ENCOUNTER — APPOINTMENT (OUTPATIENT)
Dept: PULMONOLOGY | Facility: CLINIC | Age: 79
End: 2024-12-04
Payer: MEDICARE

## 2024-12-04 ENCOUNTER — RX RENEWAL (OUTPATIENT)
Age: 79
End: 2024-12-04

## 2024-12-04 VITALS
HEIGHT: 70 IN | OXYGEN SATURATION: 100 % | BODY MASS INDEX: 26.48 KG/M2 | WEIGHT: 185 LBS | DIASTOLIC BLOOD PRESSURE: 63 MMHG | SYSTOLIC BLOOD PRESSURE: 116 MMHG | HEART RATE: 75 BPM

## 2024-12-04 VITALS
HEART RATE: 77 BPM | BODY MASS INDEX: 26.48 KG/M2 | HEIGHT: 70 IN | RESPIRATION RATE: 17 BRPM | WEIGHT: 185 LBS | OXYGEN SATURATION: 99 % | DIASTOLIC BLOOD PRESSURE: 68 MMHG | SYSTOLIC BLOOD PRESSURE: 134 MMHG

## 2024-12-04 DIAGNOSIS — R05.9 COUGH, UNSPECIFIED: ICD-10-CM

## 2024-12-04 PROCEDURE — 99204 OFFICE O/P NEW MOD 45 MIN: CPT | Mod: 25

## 2024-12-04 PROCEDURE — ZZZZZ: CPT

## 2024-12-04 PROCEDURE — 94729 DIFFUSING CAPACITY: CPT

## 2024-12-04 PROCEDURE — 94060 EVALUATION OF WHEEZING: CPT

## 2024-12-04 PROCEDURE — 94726 PLETHYSMOGRAPHY LUNG VOLUMES: CPT

## 2025-01-22 ENCOUNTER — APPOINTMENT (OUTPATIENT)
Dept: UROLOGY | Facility: CLINIC | Age: 80
End: 2025-01-22
Payer: MEDICARE

## 2025-01-22 VITALS
RESPIRATION RATE: 16 BRPM | OXYGEN SATURATION: 100 % | DIASTOLIC BLOOD PRESSURE: 76 MMHG | SYSTOLIC BLOOD PRESSURE: 124 MMHG | HEART RATE: 66 BPM | TEMPERATURE: 98 F | BODY MASS INDEX: 26.48 KG/M2 | HEIGHT: 70 IN | WEIGHT: 185 LBS

## 2025-01-22 DIAGNOSIS — N40.1 BENIGN PROSTATIC HYPERPLASIA WITH LOWER URINARY TRACT SYMPMS: ICD-10-CM

## 2025-01-22 DIAGNOSIS — N20.0 CALCULUS OF KIDNEY: ICD-10-CM

## 2025-01-22 DIAGNOSIS — R35.0 BENIGN PROSTATIC HYPERPLASIA WITH LOWER URINARY TRACT SYMPMS: ICD-10-CM

## 2025-01-22 PROCEDURE — G2211 COMPLEX E/M VISIT ADD ON: CPT

## 2025-01-22 PROCEDURE — 99214 OFFICE O/P EST MOD 30 MIN: CPT

## 2025-01-22 RX ORDER — MIRABEGRON 50 MG/1
50 TABLET, EXTENDED RELEASE ORAL
Qty: 90 | Refills: 2 | Status: ACTIVE | COMMUNITY
Start: 2025-01-22 | End: 1900-01-01

## 2025-01-24 ENCOUNTER — APPOINTMENT (OUTPATIENT)
Dept: CARDIOLOGY | Facility: CLINIC | Age: 80
End: 2025-01-24
Payer: COMMERCIAL

## 2025-01-24 VITALS
TEMPERATURE: 97.2 F | DIASTOLIC BLOOD PRESSURE: 59 MMHG | OXYGEN SATURATION: 99 % | SYSTOLIC BLOOD PRESSURE: 112 MMHG | BODY MASS INDEX: 25.79 KG/M2 | HEIGHT: 70 IN | HEART RATE: 52 BPM | WEIGHT: 180.11 LBS

## 2025-01-24 DIAGNOSIS — D37.9 NEOPLASM OF UNCERTAIN BEHAVIOR OF DIGESTIVE ORGAN, UNSPECIFIED: ICD-10-CM

## 2025-01-24 DIAGNOSIS — I50.42 CHRONIC COMBINED SYSTOLIC (CONGESTIVE) AND DIASTOLIC (CONGESTIVE) HEART FAILURE: ICD-10-CM

## 2025-01-24 DIAGNOSIS — E78.00 PURE HYPERCHOLESTEROLEMIA, UNSPECIFIED: ICD-10-CM

## 2025-01-24 DIAGNOSIS — I49.3 VENTRICULAR PREMATURE DEPOLARIZATION: ICD-10-CM

## 2025-01-24 DIAGNOSIS — I25.5 ISCHEMIC CARDIOMYOPATHY: ICD-10-CM

## 2025-01-24 DIAGNOSIS — I10 ESSENTIAL (PRIMARY) HYPERTENSION: ICD-10-CM

## 2025-01-24 PROCEDURE — 93000 ELECTROCARDIOGRAM COMPLETE: CPT

## 2025-01-24 PROCEDURE — 99215 OFFICE O/P EST HI 40 MIN: CPT

## 2025-01-24 PROCEDURE — 93010 ELECTROCARDIOGRAM REPORT: CPT

## 2025-01-24 PROCEDURE — G2211 COMPLEX E/M VISIT ADD ON: CPT | Mod: NC

## 2025-01-24 RX ORDER — METOPROLOL SUCCINATE 25 MG/1
25 TABLET, EXTENDED RELEASE ORAL TWICE DAILY
Refills: 0 | Status: ACTIVE | COMMUNITY
Start: 2025-01-24

## 2025-01-29 ENCOUNTER — APPOINTMENT (OUTPATIENT)
Dept: UROLOGY | Facility: CLINIC | Age: 80
End: 2025-01-29

## 2025-02-11 ENCOUNTER — OUTPATIENT (OUTPATIENT)
Dept: OUTPATIENT SERVICES | Facility: HOSPITAL | Age: 80
LOS: 1 days | Discharge: HOME | End: 2025-02-11

## 2025-02-11 DIAGNOSIS — D61.9 APLASTIC ANEMIA, UNSPECIFIED: ICD-10-CM

## 2025-02-19 ENCOUNTER — NON-APPOINTMENT (OUTPATIENT)
Age: 80
End: 2025-02-19

## 2025-02-19 ENCOUNTER — APPOINTMENT (OUTPATIENT)
Dept: HEMATOLOGY ONCOLOGY | Facility: CLINIC | Age: 80
End: 2025-02-19

## 2025-02-19 VITALS
DIASTOLIC BLOOD PRESSURE: 89 MMHG | RESPIRATION RATE: 16 BRPM | HEART RATE: 66 BPM | WEIGHT: 181 LBS | OXYGEN SATURATION: 98 % | SYSTOLIC BLOOD PRESSURE: 127 MMHG | HEIGHT: 69 IN | TEMPERATURE: 97 F | BODY MASS INDEX: 26.81 KG/M2

## 2025-02-19 DIAGNOSIS — D61.9 APLASTIC ANEMIA, UNSPECIFIED: ICD-10-CM

## 2025-02-19 DIAGNOSIS — D37.9 NEOPLASM OF UNCERTAIN BEHAVIOR OF DIGESTIVE ORGAN, UNSPECIFIED: ICD-10-CM

## 2025-02-19 PROCEDURE — 99214 OFFICE O/P EST MOD 30 MIN: CPT

## 2025-02-19 PROCEDURE — G2211 COMPLEX E/M VISIT ADD ON: CPT

## 2025-02-24 ENCOUNTER — RESULT REVIEW (OUTPATIENT)
Age: 80
End: 2025-02-24

## 2025-02-24 ENCOUNTER — APPOINTMENT (OUTPATIENT)
Dept: HEMATOLOGY ONCOLOGY | Facility: CLINIC | Age: 80
End: 2025-02-24

## 2025-02-24 LAB
ALBUMIN SERPL ELPH-MCNC: 3.9 G/DL
ALP BLD-CCNC: 108 U/L
ALT SERPL-CCNC: 13 U/L
ANION GAP SERPL CALC-SCNC: 9 MMOL/L
AST SERPL-CCNC: 19 U/L
BASOPHILS # BLD AUTO: 0.02 K/UL — SIGNIFICANT CHANGE UP (ref 0–0.2)
BASOPHILS NFR BLD AUTO: 0.5 % — SIGNIFICANT CHANGE UP (ref 0–2)
BILIRUB SERPL-MCNC: 0.6 MG/DL
BUN SERPL-MCNC: 17 MG/DL
CALCIUM SERPL-MCNC: 9.9 MG/DL
CHLORIDE SERPL-SCNC: 109 MMOL/L
CO2 SERPL-SCNC: 24 MMOL/L
CREAT SERPL-MCNC: 1.3 MG/DL
EGFR: 55 ML/MIN/1.73M2
EOSINOPHIL # BLD AUTO: 0.05 K/UL — SIGNIFICANT CHANGE UP (ref 0–0.5)
EOSINOPHIL NFR BLD AUTO: 1.2 % — SIGNIFICANT CHANGE UP (ref 0–6)
GLUCOSE SERPL-MCNC: 101 MG/DL
HCT VFR BLD CALC: 42.9 % — SIGNIFICANT CHANGE UP (ref 39–50)
HGB BLD-MCNC: 14.5 G/DL — SIGNIFICANT CHANGE UP (ref 13–17)
IMM GRANULOCYTES NFR BLD AUTO: 0.5 % — SIGNIFICANT CHANGE UP (ref 0–0.9)
LYMPHOCYTES # BLD AUTO: 1.49 K/UL — SIGNIFICANT CHANGE UP (ref 1–3.3)
LYMPHOCYTES # BLD AUTO: 35.2 % — SIGNIFICANT CHANGE UP (ref 13–44)
MCHC RBC-ENTMCNC: 33.8 G/DL — SIGNIFICANT CHANGE UP (ref 32–36)
MCHC RBC-ENTMCNC: 34.8 PG — HIGH (ref 27–34)
MCV RBC AUTO: 102.9 FL — HIGH (ref 80–100)
MONOCYTES # BLD AUTO: 0.43 K/UL — SIGNIFICANT CHANGE UP (ref 0–0.9)
MONOCYTES NFR BLD AUTO: 10.2 % — SIGNIFICANT CHANGE UP (ref 2–14)
NEUTROPHILS # BLD AUTO: 2.22 K/UL — SIGNIFICANT CHANGE UP (ref 1.8–7.4)
NEUTROPHILS NFR BLD AUTO: 52.4 % — SIGNIFICANT CHANGE UP (ref 43–77)
NRBC BLD AUTO-RTO: 0 /100 WBCS — SIGNIFICANT CHANGE UP (ref 0–0)
PLATELET # BLD AUTO: 83 K/UL — LOW (ref 150–400)
POTASSIUM SERPL-SCNC: 4.7 MMOL/L
PROT SERPL-MCNC: 6.6 G/DL
RBC # BLD: 4.17 M/UL — LOW (ref 4.2–5.8)
RBC # FLD: 12.8 % — SIGNIFICANT CHANGE UP (ref 10.3–14.5)
SODIUM SERPL-SCNC: 143 MMOL/L
WBC # BLD: 4.23 K/UL — SIGNIFICANT CHANGE UP (ref 3.8–10.5)
WBC # FLD AUTO: 4.23 K/UL — SIGNIFICANT CHANGE UP (ref 3.8–10.5)

## 2025-02-28 LAB — GASTRIN SERPL-MCNC: 141 PG/ML

## 2025-03-25 ENCOUNTER — RX RENEWAL (OUTPATIENT)
Age: 80
End: 2025-03-25

## 2025-04-09 ENCOUNTER — APPOINTMENT (OUTPATIENT)
Dept: RADIOLOGY | Facility: IMAGING CENTER | Age: 80
End: 2025-04-09
Payer: MEDICARE

## 2025-04-09 ENCOUNTER — OUTPATIENT (OUTPATIENT)
Dept: OUTPATIENT SERVICES | Facility: HOSPITAL | Age: 80
LOS: 1 days | End: 2025-04-09
Payer: MEDICARE

## 2025-04-09 DIAGNOSIS — R05.9 COUGH, UNSPECIFIED: ICD-10-CM

## 2025-04-09 PROCEDURE — 71046 X-RAY EXAM CHEST 2 VIEWS: CPT | Mod: 26

## 2025-04-09 PROCEDURE — 71046 X-RAY EXAM CHEST 2 VIEWS: CPT

## 2025-04-30 ENCOUNTER — APPOINTMENT (OUTPATIENT)
Dept: UROLOGY | Facility: CLINIC | Age: 80
End: 2025-04-30
Payer: MEDICARE

## 2025-04-30 VITALS
HEIGHT: 69 IN | BODY MASS INDEX: 26.66 KG/M2 | RESPIRATION RATE: 16 BRPM | TEMPERATURE: 98 F | SYSTOLIC BLOOD PRESSURE: 131 MMHG | OXYGEN SATURATION: 99 % | WEIGHT: 180 LBS | DIASTOLIC BLOOD PRESSURE: 72 MMHG | HEART RATE: 79 BPM

## 2025-04-30 PROCEDURE — 52000 CYSTOURETHROSCOPY: CPT

## 2025-05-09 ENCOUNTER — APPOINTMENT (OUTPATIENT)
Dept: UROLOGY | Facility: CLINIC | Age: 80
End: 2025-05-09
Payer: MEDICARE

## 2025-05-09 DIAGNOSIS — R35.0 BENIGN PROSTATIC HYPERPLASIA WITH LOWER URINARY TRACT SYMPMS: ICD-10-CM

## 2025-05-09 DIAGNOSIS — N40.1 BENIGN PROSTATIC HYPERPLASIA WITH LOWER URINARY TRACT SYMPMS: ICD-10-CM

## 2025-05-09 PROCEDURE — 99212 OFFICE O/P EST SF 10 MIN: CPT | Mod: 93

## 2025-05-19 ENCOUNTER — APPOINTMENT (OUTPATIENT)
Dept: UROLOGY | Facility: CLINIC | Age: 80
End: 2025-05-19
Payer: MEDICARE

## 2025-05-19 ENCOUNTER — RESULT CHARGE (OUTPATIENT)
Age: 80
End: 2025-05-19

## 2025-05-19 VITALS
HEART RATE: 71 BPM | DIASTOLIC BLOOD PRESSURE: 72 MMHG | OXYGEN SATURATION: 97 % | SYSTOLIC BLOOD PRESSURE: 129 MMHG | RESPIRATION RATE: 16 BRPM

## 2025-05-19 DIAGNOSIS — R35.0 BENIGN PROSTATIC HYPERPLASIA WITH LOWER URINARY TRACT SYMPMS: ICD-10-CM

## 2025-05-19 DIAGNOSIS — M62.81 MUSCLE WEAKNESS (GENERALIZED): ICD-10-CM

## 2025-05-19 DIAGNOSIS — R39.15 URGENCY OF URINATION: ICD-10-CM

## 2025-05-19 DIAGNOSIS — N40.1 BENIGN PROSTATIC HYPERPLASIA WITH LOWER URINARY TRACT SYMPMS: ICD-10-CM

## 2025-05-19 PROCEDURE — 99214 OFFICE O/P EST MOD 30 MIN: CPT

## 2025-05-19 RX ORDER — TADALAFIL 5 MG/1
5 TABLET ORAL
Qty: 90 | Refills: 3 | Status: ACTIVE | COMMUNITY
Start: 2025-05-19 | End: 1900-01-01

## 2025-05-21 ENCOUNTER — RX RENEWAL (OUTPATIENT)
Age: 80
End: 2025-05-21

## 2025-05-29 ENCOUNTER — EMERGENCY (EMERGENCY)
Facility: HOSPITAL | Age: 80
LOS: 1 days | End: 2025-05-29
Admitting: STUDENT IN AN ORGANIZED HEALTH CARE EDUCATION/TRAINING PROGRAM
Payer: MEDICARE

## 2025-05-29 VITALS
WEIGHT: 184.97 LBS | HEART RATE: 72 BPM | HEIGHT: 69 IN | OXYGEN SATURATION: 100 % | SYSTOLIC BLOOD PRESSURE: 133 MMHG | TEMPERATURE: 97 F | DIASTOLIC BLOOD PRESSURE: 72 MMHG | RESPIRATION RATE: 16 BRPM

## 2025-05-29 PROCEDURE — 99283 EMERGENCY DEPT VISIT LOW MDM: CPT

## 2025-05-29 NOTE — ED ADULT TRIAGE NOTE - GLASGOW COMA SCALE: SCORE, MLM
MEDICATION REFILL REQUEST    Date of last visit with Cardiologist: 12/19/22 [facility] clinic    Ph[facility] [place] [id number]   Supply requested (30/60/90 day): patient is unable to receive refill until 03/01 , pt requesting a portion of the medication until 03/01. Patient has 0 tablets remaining.     Medication Requested: bumetanide (BUMEX) 1 MG tablet  Frequency (daily, twice daily, etc.): 2 in AM 1 in PM   Number of tablets remaining as of today: 0 as of yesterday        15

## 2025-05-29 NOTE — ED PROVIDER NOTE - OBJECTIVE STATEMENT
78 y/o male c/o FB to L ear x today. Pt was using a cue tip this AM and the tip broke off. Pt was unable to get it out on his own. Denies any pain.

## 2025-05-29 NOTE — ED ADULT TRIAGE NOTE - CHIEF COMPLAINT QUOTE
Pt c/o q tip being stuck in L ear. States he was cleaning his ear and piece of q tip broke off. Denies any hearing difficulty or other complaints at this time. PMHx HTN,

## 2025-05-29 NOTE — ED PROVIDER NOTE - CLINICAL SUMMARY MEDICAL DECISION MAKING FREE TEXT BOX
78 y/o male c/o cue tip stuck in his L ear, no other complaints- Cue tip visualized in L EAC, removed with alligator forceps, canal is normal, no edema or erythema, normal light reflex, no signs of infection or trauma. stable for dc.

## 2025-05-29 NOTE — ED PROVIDER NOTE - PATIENT PORTAL LINK FT
You can access the FollowMyHealth Patient Portal offered by [facility] by registering at the following website: [email or web address]. By joining [facility]’s FollowMyHealth portal, you will also be able to view your health information using other applications (apps) compatible with our system.

## 2025-06-11 ENCOUNTER — APPOINTMENT (OUTPATIENT)
Dept: ORTHOPEDIC SURGERY | Facility: CLINIC | Age: 80
End: 2025-06-11
Payer: MEDICARE

## 2025-06-11 PROCEDURE — 99213 OFFICE O/P EST LOW 20 MIN: CPT

## 2025-06-11 PROCEDURE — 73502 X-RAY EXAM HIP UNI 2-3 VIEWS: CPT

## 2025-07-15 ENCOUNTER — APPOINTMENT (OUTPATIENT)
Dept: OTOLARYNGOLOGY | Facility: CLINIC | Age: 80
End: 2025-07-15
Payer: MEDICARE

## 2025-07-15 VITALS
HEART RATE: 53 BPM | BODY MASS INDEX: 27.7 KG/M2 | WEIGHT: 187 LBS | OXYGEN SATURATION: 98 % | DIASTOLIC BLOOD PRESSURE: 70 MMHG | HEIGHT: 69 IN | SYSTOLIC BLOOD PRESSURE: 130 MMHG

## 2025-07-15 PROCEDURE — 99203 OFFICE O/P NEW LOW 30 MIN: CPT | Mod: 25

## 2025-07-15 PROCEDURE — 31231 NASAL ENDOSCOPY DX: CPT

## 2025-07-28 ENCOUNTER — APPOINTMENT (OUTPATIENT)
Dept: CARDIOLOGY | Facility: CLINIC | Age: 80
End: 2025-07-28

## 2025-08-20 ENCOUNTER — RESULT REVIEW (OUTPATIENT)
Age: 80
End: 2025-08-20

## 2025-08-20 ENCOUNTER — APPOINTMENT (OUTPATIENT)
Dept: HEMATOLOGY ONCOLOGY | Facility: CLINIC | Age: 80
End: 2025-08-20
Payer: MEDICARE

## 2025-08-20 VITALS
DIASTOLIC BLOOD PRESSURE: 68 MMHG | OXYGEN SATURATION: 98 % | RESPIRATION RATE: 16 BRPM | SYSTOLIC BLOOD PRESSURE: 110 MMHG | HEART RATE: 73 BPM | TEMPERATURE: 98 F

## 2025-08-20 DIAGNOSIS — D37.9 NEOPLASM OF UNCERTAIN BEHAVIOR OF DIGESTIVE ORGAN, UNSPECIFIED: ICD-10-CM

## 2025-08-20 DIAGNOSIS — D61.9 APLASTIC ANEMIA, UNSPECIFIED: ICD-10-CM

## 2025-08-20 PROCEDURE — G2211 COMPLEX E/M VISIT ADD ON: CPT

## 2025-08-20 PROCEDURE — 99214 OFFICE O/P EST MOD 30 MIN: CPT

## 2025-08-21 LAB
ALBUMIN SERPL ELPH-MCNC: 4 G/DL
ALP BLD-CCNC: 106 U/L
ALT SERPL-CCNC: 14 U/L
ANION GAP SERPL CALC-SCNC: 10 MMOL/L
AST SERPL-CCNC: 21 U/L
BILIRUB SERPL-MCNC: 0.8 MG/DL
BUN SERPL-MCNC: 18 MG/DL
CALCIUM SERPL-MCNC: 9.7 MG/DL
CHLORIDE SERPL-SCNC: 108 MMOL/L
CO2 SERPL-SCNC: 23 MMOL/L
CREAT SERPL-MCNC: 1.53 MG/DL
EGFRCR SERPLBLD CKD-EPI 2021: 45 ML/MIN/1.73M2
GLUCOSE SERPL-MCNC: 93 MG/DL
POTASSIUM SERPL-SCNC: 5.2 MMOL/L
PROT SERPL-MCNC: 6.7 G/DL
SODIUM SERPL-SCNC: 141 MMOL/L

## 2025-08-22 LAB — GASTRIN SERPL-MCNC: 47 PG/ML

## 2025-09-15 ENCOUNTER — APPOINTMENT (OUTPATIENT)
Dept: CARDIOLOGY | Facility: CLINIC | Age: 80
End: 2025-09-15
Payer: MEDICARE

## 2025-09-15 VITALS
TEMPERATURE: 97.3 F | HEART RATE: 78 BPM | DIASTOLIC BLOOD PRESSURE: 79 MMHG | SYSTOLIC BLOOD PRESSURE: 131 MMHG | HEIGHT: 69 IN | BODY MASS INDEX: 27.92 KG/M2 | OXYGEN SATURATION: 98 % | WEIGHT: 188.49 LBS

## 2025-09-15 DIAGNOSIS — I49.3 VENTRICULAR PREMATURE DEPOLARIZATION: ICD-10-CM

## 2025-09-15 DIAGNOSIS — I10 ESSENTIAL (PRIMARY) HYPERTENSION: ICD-10-CM

## 2025-09-15 DIAGNOSIS — I34.0 NONRHEUMATIC MITRAL (VALVE) INSUFFICIENCY: ICD-10-CM

## 2025-09-15 DIAGNOSIS — I50.42 CHRONIC COMBINED SYSTOLIC (CONGESTIVE) AND DIASTOLIC (CONGESTIVE) HEART FAILURE: ICD-10-CM

## 2025-09-15 DIAGNOSIS — M87.00 IDIOPATHIC ASEPTIC NECROSIS OF UNSPECIFIED BONE: ICD-10-CM

## 2025-09-15 PROCEDURE — G2211 COMPLEX E/M VISIT ADD ON: CPT

## 2025-09-15 PROCEDURE — 93000 ELECTROCARDIOGRAM COMPLETE: CPT

## 2025-09-15 PROCEDURE — 99214 OFFICE O/P EST MOD 30 MIN: CPT
